# Patient Record
Sex: FEMALE | Race: WHITE | NOT HISPANIC OR LATINO | Employment: OTHER | URBAN - METROPOLITAN AREA
[De-identification: names, ages, dates, MRNs, and addresses within clinical notes are randomized per-mention and may not be internally consistent; named-entity substitution may affect disease eponyms.]

---

## 2017-08-07 ENCOUNTER — RESOLUTE PROFESSIONAL BILLING HOSPITAL PROF FEE (OUTPATIENT)
Dept: HOSPITALIST | Facility: MEDICAL CENTER | Age: 72
End: 2017-08-07
Payer: MEDICARE

## 2017-08-07 ENCOUNTER — APPOINTMENT (OUTPATIENT)
Dept: RADIOLOGY | Facility: MEDICAL CENTER | Age: 72
DRG: 189 | End: 2017-08-07
Attending: INTERNAL MEDICINE
Payer: MEDICARE

## 2017-08-07 ENCOUNTER — HOSPITAL ENCOUNTER (INPATIENT)
Facility: MEDICAL CENTER | Age: 72
LOS: 3 days | DRG: 189 | End: 2017-08-10
Attending: EMERGENCY MEDICINE | Admitting: INTERNAL MEDICINE
Payer: MEDICARE

## 2017-08-07 DIAGNOSIS — J96.20 ACUTE ON CHRONIC RESPIRATORY FAILURE, UNSPECIFIED WHETHER WITH HYPOXIA OR HYPERCAPNIA (HCC): ICD-10-CM

## 2017-08-07 PROBLEM — J96.21 ACUTE ON CHRONIC RESPIRATORY FAILURE WITH HYPOXIA AND HYPERCAPNIA (HCC): Status: ACTIVE | Noted: 2017-08-07

## 2017-08-07 PROBLEM — Z85.118 PERSONAL HISTORY OF LUNG CANCER: Status: ACTIVE | Noted: 2017-08-07

## 2017-08-07 PROBLEM — J96.22 ACUTE ON CHRONIC RESPIRATORY FAILURE WITH HYPOXIA AND HYPERCAPNIA (HCC): Status: ACTIVE | Noted: 2017-08-07

## 2017-08-07 PROBLEM — R79.89 ELEVATED D-DIMER: Status: ACTIVE | Noted: 2017-08-07

## 2017-08-07 PROBLEM — I49.9 ARRHYTHMIA: Status: ACTIVE | Noted: 2017-08-07

## 2017-08-07 PROBLEM — R65.10 SIRS (SYSTEMIC INFLAMMATORY RESPONSE SYNDROME) (HCC): Status: ACTIVE | Noted: 2017-08-07

## 2017-08-07 PROBLEM — R79.89 ELEVATED TROPONIN: Status: ACTIVE | Noted: 2017-08-07

## 2017-08-07 PROBLEM — J44.9 COPD (CHRONIC OBSTRUCTIVE PULMONARY DISEASE) (HCC): Status: ACTIVE | Noted: 2017-08-07

## 2017-08-07 PROBLEM — F03.90 DEMENTIA (HCC): Status: ACTIVE | Noted: 2017-08-07

## 2017-08-07 LAB
ALBUMIN SERPL BCP-MCNC: 3 G/DL (ref 3.2–4.9)
ALBUMIN/GLOB SERPL: 1.1 G/DL
ALP SERPL-CCNC: 58 U/L (ref 30–99)
ALT SERPL-CCNC: 10 U/L (ref 2–50)
ANION GAP SERPL CALC-SCNC: 5 MMOL/L (ref 0–11.9)
ANISOCYTOSIS BLD QL SMEAR: ABNORMAL
AST SERPL-CCNC: 14 U/L (ref 12–45)
BASE EXCESS BLDA CALC-SCNC: 17 MMOL/L (ref -4–3)
BASOPHILS # BLD AUTO: 0 % (ref 0–1.8)
BASOPHILS # BLD: 0 K/UL (ref 0–0.12)
BILIRUB SERPL-MCNC: 0.3 MG/DL (ref 0.1–1.5)
BNP SERPL-MCNC: 78 PG/ML (ref 0–100)
BODY TEMPERATURE: ABNORMAL DEGREES
BUN SERPL-MCNC: 25 MG/DL (ref 8–22)
CALCIUM SERPL-MCNC: 8.6 MG/DL (ref 8.5–10.5)
CHLORIDE SERPL-SCNC: 91 MMOL/L (ref 96–112)
CO2 BLDA-SCNC: 47 MMOL/L (ref 20–33)
CO2 SERPL-SCNC: 39 MMOL/L (ref 20–33)
CREAT SERPL-MCNC: 0.57 MG/DL (ref 0.5–1.4)
EKG IMPRESSION: NORMAL
EOSINOPHIL # BLD AUTO: 0 K/UL (ref 0–0.51)
EOSINOPHIL NFR BLD: 0 % (ref 0–6.9)
ERYTHROCYTE [DISTWIDTH] IN BLOOD BY AUTOMATED COUNT: 52.2 FL (ref 35.9–50)
GFR SERPL CREATININE-BSD FRML MDRD: >60 ML/MIN/1.73 M 2
GLOBULIN SER CALC-MCNC: 2.7 G/DL (ref 1.9–3.5)
GLUCOSE SERPL-MCNC: 117 MG/DL (ref 65–99)
HCO3 BLDA-SCNC: 45 MMOL/L (ref 17–25)
HCT VFR BLD AUTO: 31.5 % (ref 37–47)
HGB BLD-MCNC: 9.4 G/DL (ref 12–16)
LV EJECT FRACT  99904: 55
LV EJECT FRACT MOD 2C 99903: 48.34
LV EJECT FRACT MOD 4C 99902: 63.05
LV EJECT FRACT MOD BP 99901: 59.19
LYMPHOCYTES # BLD AUTO: 0.16 K/UL (ref 1–4.8)
LYMPHOCYTES NFR BLD: 3.5 % (ref 22–41)
MANUAL DIFF BLD: NORMAL
MCH RBC QN AUTO: 26.6 PG (ref 27–33)
MCHC RBC AUTO-ENTMCNC: 29.8 G/DL (ref 33.6–35)
MCV RBC AUTO: 89.2 FL (ref 81.4–97.8)
MICROCYTES BLD QL SMEAR: ABNORMAL
MONOCYTES # BLD AUTO: 0.04 K/UL (ref 0–0.85)
MONOCYTES NFR BLD AUTO: 0.9 % (ref 0–13.4)
MORPHOLOGY BLD-IMP: NORMAL
NEUTROPHILS # BLD AUTO: 4.3 K/UL (ref 2–7.15)
NEUTROPHILS NFR BLD: 95.6 % (ref 44–72)
NRBC # BLD AUTO: 0 K/UL
NRBC BLD AUTO-RTO: 0 /100 WBC
O2/TOTAL GAS SETTING VFR VENT: 40 %
PCO2 BLDA: 75.8 MMHG (ref 26–37)
PCO2 TEMP ADJ BLDA: 73 MMHG (ref 26–37)
PH BLDA: 7.38 [PH] (ref 7.4–7.5)
PH TEMP ADJ BLDA: 7.39 [PH] (ref 7.4–7.5)
PLATELET # BLD AUTO: 119 K/UL (ref 164–446)
PMV BLD AUTO: 10.3 FL (ref 9–12.9)
PO2 BLDA: 62 MMHG (ref 64–87)
PO2 TEMP ADJ BLDA: 58 MMHG (ref 64–87)
POIKILOCYTOSIS BLD QL SMEAR: NORMAL
POTASSIUM SERPL-SCNC: 4.2 MMOL/L (ref 3.6–5.5)
PROCALCITONIN SERPL-MCNC: 1.66 NG/ML
PROT SERPL-MCNC: 5.7 G/DL (ref 6–8.2)
RBC # BLD AUTO: 3.53 M/UL (ref 4.2–5.4)
RBC BLD AUTO: PRESENT
SAO2 % BLDA: 89 % (ref 93–99)
SCHISTOCYTES BLD QL SMEAR: NORMAL
SODIUM SERPL-SCNC: 135 MMOL/L (ref 135–145)
SPECIMEN DRAWN FROM PATIENT: ABNORMAL
SPHEROCYTES BLD QL SMEAR: NORMAL
TROPONIN I SERPL-MCNC: 0.02 NG/ML (ref 0–0.04)
TROPONIN I SERPL-MCNC: 0.03 NG/ML (ref 0–0.04)
TSH SERPL DL<=0.005 MIU/L-ACNC: 1.4 UIU/ML (ref 0.3–3.7)
WBC # BLD AUTO: 4.5 K/UL (ref 4.8–10.8)

## 2017-08-07 PROCEDURE — 85027 COMPLETE CBC AUTOMATED: CPT

## 2017-08-07 PROCEDURE — 84145 PROCALCITONIN (PCT): CPT

## 2017-08-07 PROCEDURE — 93306 TTE W/DOPPLER COMPLETE: CPT | Mod: 26 | Performed by: INTERNAL MEDICINE

## 2017-08-07 PROCEDURE — 94002 VENT MGMT INPAT INIT DAY: CPT

## 2017-08-07 PROCEDURE — 93005 ELECTROCARDIOGRAM TRACING: CPT | Performed by: INTERNAL MEDICINE

## 2017-08-07 PROCEDURE — 700111 HCHG RX REV CODE 636 W/ 250 OVERRIDE (IP): Performed by: INTERNAL MEDICINE

## 2017-08-07 PROCEDURE — 94640 AIRWAY INHALATION TREATMENT: CPT

## 2017-08-07 PROCEDURE — 303105 HCHG CATHETER EXTRA

## 2017-08-07 PROCEDURE — 99291 CRITICAL CARE FIRST HOUR: CPT

## 2017-08-07 PROCEDURE — 80053 COMPREHEN METABOLIC PANEL: CPT

## 2017-08-07 PROCEDURE — 84484 ASSAY OF TROPONIN QUANT: CPT

## 2017-08-07 PROCEDURE — 84443 ASSAY THYROID STIM HORMONE: CPT

## 2017-08-07 PROCEDURE — 770022 HCHG ROOM/CARE - ICU (200)

## 2017-08-07 PROCEDURE — 82803 BLOOD GASES ANY COMBINATION: CPT

## 2017-08-07 PROCEDURE — 700101 HCHG RX REV CODE 250: Performed by: INTERNAL MEDICINE

## 2017-08-07 PROCEDURE — 87040 BLOOD CULTURE FOR BACTERIA: CPT

## 2017-08-07 PROCEDURE — 96365 THER/PROPH/DIAG IV INF INIT: CPT

## 2017-08-07 PROCEDURE — A9270 NON-COVERED ITEM OR SERVICE: HCPCS | Performed by: INTERNAL MEDICINE

## 2017-08-07 PROCEDURE — 96375 TX/PRO/DX INJ NEW DRUG ADDON: CPT

## 2017-08-07 PROCEDURE — 83880 ASSAY OF NATRIURETIC PEPTIDE: CPT

## 2017-08-07 PROCEDURE — 51702 INSERT TEMP BLADDER CATH: CPT

## 2017-08-07 PROCEDURE — 99221 1ST HOSP IP/OBS SF/LOW 40: CPT | Performed by: INTERNAL MEDICINE

## 2017-08-07 PROCEDURE — 36600 WITHDRAWAL OF ARTERIAL BLOOD: CPT

## 2017-08-07 PROCEDURE — 93306 TTE W/DOPPLER COMPLETE: CPT

## 2017-08-07 PROCEDURE — 700105 HCHG RX REV CODE 258: Performed by: INTERNAL MEDICINE

## 2017-08-07 PROCEDURE — 700102 HCHG RX REV CODE 250 W/ 637 OVERRIDE(OP): Performed by: INTERNAL MEDICINE

## 2017-08-07 PROCEDURE — 85007 BL SMEAR W/DIFF WBC COUNT: CPT

## 2017-08-07 PROCEDURE — 71010 DX-CHEST-PORTABLE (1 VIEW): CPT

## 2017-08-07 RX ORDER — LORAZEPAM 2 MG/ML
0.5 INJECTION INTRAMUSCULAR EVERY 6 HOURS PRN
Status: DISCONTINUED | OUTPATIENT
Start: 2017-08-07 | End: 2017-08-10 | Stop reason: HOSPADM

## 2017-08-07 RX ORDER — ONDANSETRON 4 MG/1
4 TABLET, ORALLY DISINTEGRATING ORAL EVERY 4 HOURS PRN
Status: DISCONTINUED | OUTPATIENT
Start: 2017-08-07 | End: 2017-08-10 | Stop reason: HOSPADM

## 2017-08-07 RX ORDER — FUROSEMIDE 20 MG/1
20 TABLET ORAL DAILY
Status: ON HOLD | COMMUNITY
End: 2017-08-10

## 2017-08-07 RX ORDER — METHYLPREDNISOLONE SODIUM SUCCINATE 125 MG/2ML
62.5 INJECTION, POWDER, LYOPHILIZED, FOR SOLUTION INTRAMUSCULAR; INTRAVENOUS EVERY 6 HOURS
Status: DISCONTINUED | OUTPATIENT
Start: 2017-08-07 | End: 2017-08-08

## 2017-08-07 RX ORDER — ONDANSETRON 2 MG/ML
4 INJECTION INTRAMUSCULAR; INTRAVENOUS EVERY 4 HOURS PRN
Status: DISCONTINUED | OUTPATIENT
Start: 2017-08-07 | End: 2017-08-10 | Stop reason: HOSPADM

## 2017-08-07 RX ORDER — METHYLPREDNISOLONE SODIUM SUCCINATE 125 MG/2ML
125 INJECTION, POWDER, LYOPHILIZED, FOR SOLUTION INTRAMUSCULAR; INTRAVENOUS EVERY 6 HOURS
Status: DISCONTINUED | OUTPATIENT
Start: 2017-08-07 | End: 2017-08-07

## 2017-08-07 RX ORDER — BUDESONIDE AND FORMOTEROL FUMARATE DIHYDRATE 160; 4.5 UG/1; UG/1
2 AEROSOL RESPIRATORY (INHALATION)
Status: DISCONTINUED | OUTPATIENT
Start: 2017-08-07 | End: 2017-08-09

## 2017-08-07 RX ORDER — TIOTROPIUM BROMIDE 18 UG/1
1 CAPSULE ORAL; RESPIRATORY (INHALATION)
Status: DISCONTINUED | OUTPATIENT
Start: 2017-08-07 | End: 2017-08-09

## 2017-08-07 RX ORDER — ACETAMINOPHEN 650 MG/1
650 SUPPOSITORY RECTAL EVERY 4 HOURS PRN
Status: DISCONTINUED | OUTPATIENT
Start: 2017-08-07 | End: 2017-08-10 | Stop reason: HOSPADM

## 2017-08-07 RX ORDER — IPRATROPIUM BROMIDE AND ALBUTEROL SULFATE 2.5; .5 MG/3ML; MG/3ML
3 SOLUTION RESPIRATORY (INHALATION)
Status: DISCONTINUED | OUTPATIENT
Start: 2017-08-07 | End: 2017-08-08

## 2017-08-07 RX ORDER — ACETAMINOPHEN 325 MG/1
650 TABLET ORAL EVERY 4 HOURS PRN
Status: DISCONTINUED | OUTPATIENT
Start: 2017-08-07 | End: 2017-08-10 | Stop reason: HOSPADM

## 2017-08-07 RX ORDER — LORAZEPAM 0.5 MG/1
0.5 TABLET ORAL EVERY 6 HOURS PRN
Status: DISCONTINUED | OUTPATIENT
Start: 2017-08-07 | End: 2017-08-10 | Stop reason: HOSPADM

## 2017-08-07 RX ADMIN — AMPICILLIN SODIUM AND SULBACTAM SODIUM 3 G: 2; 1 INJECTION, POWDER, FOR SOLUTION INTRAMUSCULAR; INTRAVENOUS at 14:04

## 2017-08-07 RX ADMIN — BUDESONIDE AND FORMOTEROL FUMARATE DIHYDRATE 2 PUFF: 160; 4.5 AEROSOL RESPIRATORY (INHALATION) at 18:51

## 2017-08-07 RX ADMIN — AMPICILLIN SODIUM AND SULBACTAM SODIUM 3 G: 2; 1 INJECTION, POWDER, FOR SOLUTION INTRAMUSCULAR; INTRAVENOUS at 22:47

## 2017-08-07 RX ADMIN — DOXYCYCLINE 100 MG: 100 INJECTION, POWDER, LYOPHILIZED, FOR SOLUTION INTRAVENOUS at 21:20

## 2017-08-07 RX ADMIN — IPRATROPIUM BROMIDE AND ALBUTEROL SULFATE 3 ML: .5; 3 SOLUTION RESPIRATORY (INHALATION) at 04:29

## 2017-08-07 RX ADMIN — DOXYCYCLINE 100 MG: 100 INJECTION, POWDER, LYOPHILIZED, FOR SOLUTION INTRAVENOUS at 03:04

## 2017-08-07 RX ADMIN — CEFTRIAXONE 2 G: 2 INJECTION, POWDER, FOR SOLUTION INTRAMUSCULAR; INTRAVENOUS at 08:54

## 2017-08-07 RX ADMIN — METHYLPREDNISOLONE SODIUM SUCCINATE 62.5 MG: 125 INJECTION, POWDER, FOR SOLUTION INTRAMUSCULAR; INTRAVENOUS at 06:32

## 2017-08-07 RX ADMIN — TIOTROPIUM BROMIDE 1 CAPSULE: 18 CAPSULE ORAL; RESPIRATORY (INHALATION) at 18:51

## 2017-08-07 RX ADMIN — IPRATROPIUM BROMIDE AND ALBUTEROL SULFATE 3 ML: .5; 3 SOLUTION RESPIRATORY (INHALATION) at 08:44

## 2017-08-07 RX ADMIN — ENOXAPARIN SODIUM 40 MG: 100 INJECTION SUBCUTANEOUS at 08:54

## 2017-08-07 RX ADMIN — IPRATROPIUM BROMIDE AND ALBUTEROL SULFATE 3 ML: .5; 3 SOLUTION RESPIRATORY (INHALATION) at 18:37

## 2017-08-07 RX ADMIN — METHYLPREDNISOLONE SODIUM SUCCINATE 125 MG: 125 INJECTION, POWDER, FOR SOLUTION INTRAMUSCULAR; INTRAVENOUS at 02:39

## 2017-08-07 RX ADMIN — IPRATROPIUM BROMIDE AND ALBUTEROL SULFATE 3 ML: .5; 3 SOLUTION RESPIRATORY (INHALATION) at 14:10

## 2017-08-07 RX ADMIN — IPRATROPIUM BROMIDE AND ALBUTEROL SULFATE 3 ML: .5; 3 SOLUTION RESPIRATORY (INHALATION) at 22:02

## 2017-08-07 RX ADMIN — DOXYCYCLINE 100 MG: 100 INJECTION, POWDER, LYOPHILIZED, FOR SOLUTION INTRAVENOUS at 08:54

## 2017-08-07 RX ADMIN — METHYLPREDNISOLONE SODIUM SUCCINATE 62.5 MG: 125 INJECTION, POWDER, FOR SOLUTION INTRAMUSCULAR; INTRAVENOUS at 11:24

## 2017-08-07 RX ADMIN — METHYLPREDNISOLONE SODIUM SUCCINATE 62.5 MG: 125 INJECTION, POWDER, FOR SOLUTION INTRAMUSCULAR; INTRAVENOUS at 17:15

## 2017-08-07 RX ADMIN — AMPICILLIN SODIUM AND SULBACTAM SODIUM 3 G: 2; 1 INJECTION, POWDER, FOR SOLUTION INTRAMUSCULAR; INTRAVENOUS at 17:16

## 2017-08-07 ASSESSMENT — PAIN SCALES - GENERAL
PAINLEVEL_OUTOF10: 0

## 2017-08-07 ASSESSMENT — LIFESTYLE VARIABLES
EVER_SMOKED: YES
DO YOU DRINK ALCOHOL: NO

## 2017-08-07 ASSESSMENT — PULMONARY FUNCTION TESTS: EPAP_CMH2O: 8

## 2017-08-07 NOTE — IP AVS SNAPSHOT
" <p align=\"LEFT\"><IMG SRC=\"//[email or web address]$/[email or web address]\" alt=\"Image\" WIDTH=\"50%\" HEIGHT=\"200\" BORDER=\"\"></p>                   Name:Lizette Salgado  Medical Record Number:[id number]  CSN: [id number]    YOB: 1945   Age: 71 y.o.  Sex: female  HT:1.702 m (5' 7.01\") WT: 58.2 kg (128 lb 4.9 oz)          Admit Date: 8/7/2017     Discharge Date:   Today's Date: 8/10/2017  Attending Doctor:  Marnie Hicks M.D.                  Allergies:  Pollen extract          Follow-up Information     1. Follow up with [facility] ([facility]) .    Specialty:  Home Health Services    Contact information    [address]  [place] [address]  [phone]         Medication List      Take these Medications        Instructions    acetaZOLAMIDE 250 MG Tabs   Commonly known as:  DIAMOX    Take 1 Tab by mouth 2 Times a Day.   Dose:  250 mg       amoxicillin-clavulanate 875-125 MG Tabs   Commonly known as:  AUGMENTIN    Take 1 Tab by mouth 2 times a day for 6 days.   Dose:  1 Tab       BENICAR 40 MG Tabs   Generic drug:  olmesartan    Take 40 mg by mouth every day.   Dose:  40 mg       budesonide-formoterol 160-4.5 MCG/ACT Aero   Commonly known as:  SYMBICORT    Inhale 2 Puffs by mouth 2 Times a Day.   Dose:  2 Puff       doxycycline monohydrate 100 MG tablet   Commonly known as:  ADOXA    Take 1 Tab by mouth every 12 hours for 5 days.   Dose:  100 mg       ipratropium-albuterol 0.5-2.5 (3) MG/3ML nebulizer solution   Commonly known as:  DUONEB    3 mL by Nebulization route every 6 hours as needed for Shortness of Breath.   Dose:  3 mL       metoprolol 200 MG XL tablet   Commonly known as:  TOPROL-XL    Take 200 mg by mouth every day.   Dose:  200 mg       potassium chloride SA 20 MEQ Tbcr   Commonly known as:  Kdur    Take 1 Tab by mouth every day.   Dose:  20 mEq       predniSONE 20 MG Tabs   Start taking on:  8/11/2017   Commonly known as:  DELTASONE    Take 2 tablets po daily for 4 days.       terazosin 5 " MG Caps   Commonly known as:  HYTRIN    Take 5 mg by mouth 2 times a day.   Dose:  5 mg       tiotropium 18 MCG Caps   Commonly known as:  SPIRIVA    Inhale 1 Cap by mouth every day.   Dose:  18 mcg

## 2017-08-07 NOTE — IP AVS SNAPSHOT
8/10/2017    Lizette Salgado  [address]  [place] [address]    Dear Lizette:    [facility] wants to ensure your discharge home is safe and you or your loved ones have had all of your questions answered regarding your care after you leave the hospital.    Below is a list of resources and contact information should you have any questions regarding your hospital stay, follow-up instructions, or active medical symptoms.    Questions or Concerns Regarding… Contact   Medical Questions Related to Your Discharge  (7 days a week, 8am-5pm) Contact a Nurse Care Coordinator   [phone]   Medical Questions Not Related to Your Discharge  (24 hours a day / 7 days a week)  Contact the Nurse Health Line   [phone]    Medications or Discharge Instructions Refer to your discharge packet   or contact your [facility] Primary Care Provider   [phone]   Follow-up Appointment(s) Schedule your appointment via [employer]   or contact Scheduling [phone]   Billing Review your statement via [employer]ici  or contact Billing [phone]   Medical Records Review your records via [employer]   or contact Medical Records [phone]     You may receive a telephone call within two days of discharge. This call is to make certain you understand your discharge instructions and have the opportunity to have any questions answered. You can also easily access your medical information, test results and upcoming appointments via the [employer] free online health management tool. You can learn more and sign up at [email or web address]. For assistance setting up your [employer] account, please call [phone].    Once again, we want to ensure your discharge home is safe and that you have a clear understanding of any next steps in your care. If you have any questions or concerns, please do not hesitate to contact us, we are here for you. Thank you for choosing [facility] for your healthcare needs.    Sincerely,    Your [facility] Healthcare Team

## 2017-08-07 NOTE — IP AVS SNAPSHOT
" Home Care Instructions                                                                                                                  Name:Lizette Salgado  Medical Record Number:[id number]  CSN: [id number]    YOB: 1945   Age: 71 y.o.  Sex: female  HT:1.702 m (5' 7.01\") WT: 58.2 kg (128 lb 4.9 oz)          Admit Date: 8/7/2017     Discharge Date:   Today's Date: 8/10/2017  Attending Doctor:  Marnie Hicks M.D.                  Allergies:  Pollen extract            Discharge Instructions       Discharge Instructions    Discharged to home by car with relative. Discharged via wheelchair, hospital escort: Yes.  Special equipment needed: Not Applicable    Be sure to schedule a follow-up appointment with your primary care doctor or any specialists as instructed.     Discharge Plan:   Diet Plan: Discussed  Activity Level: Discussed  Confirmed Follow up Appointment: Patient to Call and Schedule Appointment  Confirmed Symptoms Management: Discussed  Medication Reconciliation Updated: Yes  Influenza Vaccine Indication: Not indicated: Previously immunized this influenza season and > 8 years of age    I understand that a diet low in cholesterol, fat, and sodium is recommended for good health. Unless I have been given specific instructions below for another diet, I accept this instruction as my diet prescription.   Other diet: Regular as tolerated.     Special Instructions: None    · Is patient discharged on Warfarin / Coumadin?   No     · Is patient Post Blood Transfusion?  No    Depression / Suicide Risk    As you are discharged from this [facility] facility, it is important to learn how to keep safe from harming yourself.    Recognize the warning signs:  · Abrupt changes in personality, positive or negative- including increase in energy   · Giving away possessions  · Change in eating patterns- significant weight changes-  positive or negative  · Change in sleeping patterns- unable to sleep or sleeping all the " time   · Unwillingness or inability to communicate  · Depression  · Unusual sadness, discouragement and loneliness  · Talk of wanting to die  · Neglect of personal appearance   · Rebelliousness- reckless behavior  · Withdrawal from people/activities they love  · Confusion- inability to concentrate     If you or a loved one observes any of these behaviors or has concerns about self-harm, here's what you can do:  · Talk about it- your feelings and reasons for harming yourself  · Remove any means that you might use to hurt yourself (examples: pills, rope, extension cords, firearm)  · Get professional help from the community (Mental Health, Substance Abuse, psychological counseling)  · Do not be alone:Call your Safe Contact- someone whom you trust who will be there for you.  · Call your local CRISIS HOTLINE 398-9454 or 115-741-8566  · Call your local Children's Mobile Crisis Response Team [place] (539) 345-9455 or www.[employer]  · Call the toll free National Suicide Prevention Hotlines   · National Suicide Prevention Lifeline 020-722-ZMKU (1702)  · [place] Hope Line Network 800-SUICIDE (958-3986)        Follow-up Information     1. Follow up with [facility] ([facility]) .    Specialty:  Home Health Services    Contact information    [address]  [facility] [address]  [phone]         Discharge Medication Instructions:    Below are the medications your physician expects you to take upon discharge:    Review all your home medications and newly ordered medications with your doctor and/or pharmacist. Follow medication instructions as directed by your doctor and/or pharmacist.    Please keep your medication list with you and share with your physician.               Medication List      START taking these medications        Instructions    Morning Afternoon Evening Bedtime    acetaZOLAMIDE 250 MG Tabs   Last time this was given:  250 mg on 8/10/2017  5:59 AM   Commonly known as:   DIAMOX        Take 1 Tab by mouth 2 Times a Day.   Dose:  250 mg                        amoxicillin-clavulanate 875-125 MG Tabs   Commonly known as:  AUGMENTIN        Take 1 Tab by mouth 2 times a day for 6 days.   Dose:  1 Tab                        budesonide-formoterol 160-4.5 MCG/ACT Aero   Last time this was given:  2 Puffs on 8/10/2017  8:50 AM   Commonly known as:  SYMBICORT        Inhale 2 Puffs by mouth 2 Times a Day.   Dose:  2 Puff                        doxycycline monohydrate 100 MG tablet   Last time this was given:  100 mg on 8/10/2017  8:45 AM   Commonly known as:  ADOXA        Take 1 Tab by mouth every 12 hours for 5 days.   Dose:  100 mg                        ipratropium-albuterol 0.5-2.5 (3) MG/3ML nebulizer solution   Last time this was given:  3 mL on 8/9/2017  7:01 AM   Commonly known as:  DUONEB        3 mL by Nebulization route every 6 hours as needed for Shortness of Breath.   Dose:  3 mL                        potassium chloride SA 20 MEQ Tbcr   Last time this was given:  20 mEq on 8/10/2017  8:45 AM   Commonly known as:  Kdur        Take 1 Tab by mouth every day.   Dose:  20 mEq                        predniSONE 20 MG Tabs   Start taking on:  8/11/2017   Last time this was given:  60 mg on 8/10/2017  8:47 AM   Commonly known as:  DELTASONE        Take 2 tablets po daily for 4 days.                        tiotropium 18 MCG Caps   Last time this was given:  1 Cap on 8/10/2017  8:50 AM   Commonly known as:  SPIRIVA        Inhale 1 Cap by mouth every day.   Dose:  18 mcg                          CONTINUE taking these medications        Instructions    Morning Afternoon Evening Bedtime    BENICAR 40 MG Tabs   Last time this was given:  40 mg on 8/10/2017  8:48 AM   Generic drug:  olmesartan        Take 40 mg by mouth every day.   Dose:  40 mg                        metoprolol 200 MG XL tablet   Last time this was given:  200 mg on 8/10/2017  8:46 AM   Commonly known as:  TOPROL-XL        Take  200 mg by mouth every day.   Dose:  200 mg                        terazosin 5 MG Caps   Last time this was given:  5 mg on 8/10/2017 10:49 AM   Commonly known as:  HYTRIN        Take 5 mg by mouth 2 times a day.   Dose:  5 mg                          STOP taking these medications     furosemide 20 MG Tabs   Commonly known as:  LASIX               hydrochlorothiazide 12.5 MG tablet   Commonly known as:  HYDRODIURIL               HYDROCHLOROTHIAZIDE PO                    Where to Get Your Medications      These medications were sent to [facility] - [place], [place] - [address] [address] [facility] [address]     Phone:  [phone]    - acetaZOLAMIDE 250 MG Tabs  - amoxicillin-clavulanate 875-125 MG Tabs  - budesonide-formoterol 160-4.5 MCG/ACT Aero  - doxycycline monohydrate 100 MG tablet  - ipratropium-albuterol 0.5-2.5 (3) MG/3ML nebulizer solution  - potassium chloride SA 20 MEQ Tbcr  - predniSONE 20 MG Tabs  - tiotropium 18 MCG Caps            Orders for after discharge     REFERRAL TO HOME HEALTH    Complete by:  As directed    Home health will create and establish a plan of care             Instructions           Diet / Nutrition:    Follow any diet instructions given to you by your doctor or the dietician, including how much salt (sodium) you are allowed each day.    If you are overweight, talk to your doctor about a weight reduction plan.    Activity:    Remain physically active following your doctor's instructions about exercise and activity.    Rest often.     Any time you become even a little tired or short of breath, SIT DOWN and rest.    Worsening Symptoms:    Report any of the following signs and symptoms to the doctor's office immediately:    *Pain of jaw, arm, or neck  *Chest pain not relieved by medication                               *Dizziness or loss of consciousness  *Difficulty breathing even when at rest   *More tired than usual                                        *Bleeding drainage or swelling of surgical site  *Swelling of feet, ankles, legs or stomach                 *Fever (>100ºF)  *Pink or blood tinged sputum  *Weight gain (3lbs/day or 5lbs /week)           *Shock from internal defibrillator (if applicable)  *Palpitations or irregular heartbeats                *Cool and/or numb extremities    Stroke Awareness    Common Risk Factors for Stroke include:    Age  Atrial Fibrillation  Carotid Artery Stenosis  Diabetes Mellitus  Excessive alcohol consumption  High blood pressure  Overweight   Physical inactivity  Smoking    Warning signs and symptoms of a stroke include:    *Sudden numbness or weakness of the face, arm or leg (especially on one side of the body).  *Sudden confusion, trouble speaking or understanding.  *Sudden trouble seeing in one or both eyes.  *Sudden trouble walking, dizziness, loss of balance or coordination.Sudden severe headache with no known cause.    It is very important to get treatment quickly when a stroke occurs. If you experience any of the above warning signs, call 911 immediately.                   Disclaimer         Quit Smoking / Tobacco Use:    I understand the use of any tobacco products increases my chance of suffering from future heart disease or stroke and could cause other illnesses which may shorten my life. Quitting the use of tobacco products is the single most important thing I can do to improve my health. For further information on smoking / tobacco cessation call a Toll Free Quit Line at 1-791.243.4079 (*National Cancer [place]) or 1-502.449.4700 (American Lung Association) or you can access the web based program at www.lungusa.org.    [place] Tobacco Users Help Line:  (927) 322-2725       Toll Free: 1-286.341.8175  Quit Tobacco Program [facility] (675)160-3774    Crisis Hotline:    [place] Crisis Hotline:  7-917-FXNIFBO or 1-251.908.7358    [place] Crisis Hotline:    1-757.538.7047 or  555.475.3160    Discharge Survey:   Thank you for choosing [facility]. We hope we did everything we could to make your hospital stay a pleasant one. You may be receiving a phone survey and we would appreciate your time and participation in answering the questions. Your input is very valuable to us in our efforts to improve our service to our patients and their families.        My signature on this form indicates that:    1. I have reviewed and understand the above information.  2. My questions regarding this information have been answered to my satisfaction.  3. I have formulated a plan with my discharge nurse to obtain my prescribed medications for home.                  Disclaimer         __________________________________                     __________       ________                       Patient Signature                                                 Date                    Time

## 2017-08-07 NOTE — H&P
CHIEF COMPLAINT: shortness of breath    HPI: This is a 71-year-old female with past medical history of COPD dementia who presented to [facility] complaining of shortness of breath worsening over the past 2 days associated with productive cough she denied chest pain dizziness she recently had her Lasix dose decreased by her outpatient physician and seem to be getting worse since that time patient has a history of smoking but apparently quit 6 months ago she is chronically on oxygen at 2-4 L at home. Despite initial improvement after nebulizer treatment her respiratory status deteriorated given her borderline troponin was felt she required heil higher level of care and she was transferred here. Emergency room physician discussed the patient's code status with the patient's spouse and he wished for her to be a full code.    PMH: COPD on home oxygen, dementia. Lung cancer treated w/ [facility].  No prior Dx CHF, No h/o MI    MEDS: Lasix 20 mg, Hytrin 5 mg, Toprol 50 mg, albuterol nebulizer, Advair Diskus 250/50, Spiriva    PSH: Open lung biopsy, Hyst, Open Choley    FMH: Per our records- hypertension and lung disease    SOCIAL HISTORY: , doesn't drink, quit smoking 6 months ago, 6 children- only 1 daughter lives near her, the rest out of state    CODE STATUS: Per ERP discussion with spouse: full code    ROS: Patient has dementia and is sedated on BiPAP- unable to obtain    EXAM:     VITALS:  At [facility] d/c: Blood pressure 101/61 pulse 102 respirations 14 temperature 98.9 saturating 97.2% on BiPAP    Temperature here 36.8 pulse 109 respirations 22 blood pressure 122/67 saturating 98% on 15 L with BiPAP    GENERAL: Pale elderly  female heavily sedated on BiPAP she looks older than chronological age    HEENT: Pupils 1 mm and reactive, head normocephalic atraumatic. Unable to visualize oral cavity due to BiPAP mask    NECK: Supple I cannot appreciate a JVD -she has a lot of wrinkles    LUNGS:  Despite BiPAP they're markedly diminished there are no appreciable wheezes or rales    HEART: Tachycardia regular    ABDOMEN: Somewhat protuberant but not overtly distended she does grimace when I palpate her abdomen but there is no rebound guarding or appreciable mass    EXTREMITIES: She has 2-3+ edema from mid shin down bilaterally is 3+ over her feet.    NEURO: Sedated but seems to move all extremities when I palpate her belly    PSYCH: Unable to discern due to sedation    LABS/ RAD:  From [facility]: ABG: pH 7.31,pCO2 82.4, pO2 59.6 4L  WBC 5.1, hemoglobin 10.3 hematocrit 32.3 platelet count 118  67% polys.  Sodium 135 potassium 4.2 chloride 92 bicarb 44 glucose 119 BUN 28 creatinine 0.82 albumin 2.7 calcium 9. D-dimer 1.10 troponin 0.08.   No chest x-ray images were sent radiologic report describes chronic changes of the lungs with vascular congestion and pleural effusions cannot be ruled out congestive heart failure versus pulmonary fibrosis.  EKG the computer reads it as a flutter with PVCs her rate is 136- I reviewed the tracing- I don't think this is flutter think this is sinus tachycardia I don't see any evidence of ischemia    A/P:  1. Acute on chronic respiratory failure with hypercapnia and hypoxemia. Chest x-ray interpretation that accompanies the patient gives a broad differential we will repeat her imaging. If there is insufficient findings on the chest x-ray to explain her hypoxemia consider CT of the chest as she does have a positive d-dimer. We'll also get a BNP to evaluate for possibility of pulmonary edema echocardiogram has also been ordered. Given that I cannot review images pneumonia is certainly in the differential so we will order blood cultures and start her on empiric therapy. Due to her BiPAP she will require ICU admission and pulmonary is aware of the consultation. IV steroids and RT protocol ordered.    2. Elevated troponin this is only mildly elevated we'll obtain follow-up studies,  echocardiogram. This is not high enough to be diagnostic of NSTEMI. EKG shows no obvious ischemia    3. Arrhythmia- there were sinus pauses as well [facility]    4. Query history of congestive heart failure given her home medications- family denies prior diagnosis -echo has been ordered-    I reviewed her CXR here-  there are stippled markings in her upper lobes this is not convincing for CHF. Will not order lasix at this time    5. Elevated d-dimer, CXR does not fully account for hypoxemia- CTA ordered    6. History of dementia per family never diagnosed but some days she does not recognize her spouse.    Patient is critically ill with hypoxic respiratory failure likely secondary to exacerbation of COPD -

## 2017-08-07 NOTE — CARE PLAN
Problem: Safety  Goal: Will remain free from injury  Safety and fall precautions in place, bed in lowest position, trend socks on patient.  Educated patient on call light system.  Personal items and call light within reach, bed alarm on.      Problem: Knowledge Deficit  Goal: Knowledge of disease process/condition, treatment plan, diagnostic tests, and medications will improve  Bedside report received.  POC discussed with patient, , daughter and son-in-law, addressed their questions and concerns to the best of my ability.

## 2017-08-07 NOTE — RESPIRATORY CARE
Bipap/Cpap mask placed.  Can the patient demonstrate removal of mask? NO  If no, please notify physician.    Physician aware. BiPAP not to be worn per Jensen

## 2017-08-07 NOTE — CONSULTS
Critical Care/Pulmonary Consultation    Date of service: 8/7/2017    Consulting Physician:  Angel Luis Wisdom MD    History of Present Illness: I was called to the emergency department to evaluate this patient was placed on BiPAP for respiratory failure. She is a 71-year-old female with a history of fairly advanced dementia, severe COPD with chronic hypercapnia, chronic hypoxia on supplemental oxygen, prior lung cancer treated with CyberKnife therapy. Her  tells me she has been in the hospital nearly monthly for the past several months. She was recently admitted for COPD exacerbation treated with BiPAP for several days at [facility]. Today she was brought to [facility] complaining of increasing dyspnea and breathing problems over the past 2-3 days. She did have an increasing productive cough. She initially improved with nebulized bronchodilators however then declined and was transferred to [facility]. In the ED she was in significant respiratory distress with very little air in tree. She was placed on AVAPS/BiPAP. She did receive some Ativan in transport and was tolerating the BiPAP well. She was initially full CODE STATUS. However had a long discussion with the patient's  as well as her daughter at bedside. He tells me he wouldn't want any treatments that would cause her to suffer. He does want to continue with BiPAP trial but would not want intubation or chest compressions. I therefore changed her CODE STATUS to DO NOT RESUSCITATE.    No current facility-administered medications on file prior to encounter.     Current Outpatient Prescriptions on File Prior to Encounter   Medication Sig Dispense Refill   • metoprolol (TOPROL-XL) 200 MG XL tablet Take 200 mg by mouth every day.     • terazosin (HYTRIN) 5 MG Cap Take 5 mg by mouth 2 times a day.     • olmesartan (BENICAR) 40 MG Tab Take 40 mg by mouth every day.     • hydrochlorothiazide (HYDRODIURIL) 12.5 MG tablet  "Take 12.5 mg by mouth 3 times a day.     • HYDROCHLOROTHIAZIDE PO Take  by mouth. 12 mg two tablets once a day         Social History   Substance Use Topics   • Smoking status: Never Smoker    • Smokeless tobacco: Never Used   • Alcohol Use: No        Past Medical History   Diagnosis Date   • Lung cancer (CMS-[facility])    • COPD (chronic obstructive pulmonary disease) (CMS-[facility])    • [place] measles        Past Surgical History   Procedure Laterality Date   • Lung biopsy open         Allergies: Eggs and Pollen extract    Family History   Problem Relation Age of Onset   • Hypertension Mother    • Lung Disease Father        Filed Vitals:    08/07/17 0112 08/07/17 0114 08/07/17 0212 08/07/17 0230   Height: 1.702 m (5' 7.01\")      Weight: 68.04 kg (150 lb)      Weight % change since last entry.: 0 %      BP:  122/67     Pulse:  109 89 92   BMI (Calculated): 23.49      Resp:  22 25 33   Temp:  36.8 °C (98.3 °F)      08/07/17 0245 08/07/17 0300 08/07/17 0418   Height:      Weight:      Weight % change since last entry.:      BP:      Pulse: 92 89 95   BMI (Calculated):      Resp: 23 21 20   Temp:          Physical Examination  General: Sedated but arouses, on BiPAP  Neuro/Psych: Somnolent and not following commands consistently, withdraws all extremities  HEENT: PERRL, mucous membranes mildly dry, trachea midline, no crepitus  CVS: Regular rate and rhythm  Respiratory: Very diminished air entry throughout, moderate expiratory wheeze bilaterally  Abdomen/: Soft, nontender  Extremities: 2+ pitting edema lower extremities, capillary refill intact  Skin: Cool, dry, no rash    Recent Labs      08/07/17 0114   WBC  4.5*   NEUTSPOLYS  95.60*   LYMPHOCYTES  3.50*   MONOCYTES  0.90   EOSINOPHILS  0.00   BASOPHILS  0.00   ASTSGOT  14   ALTSGPT  10   ALKPHOSPHAT  58   TBILIRUBIN  0.3     Recent Labs      08/07/17 0114   SODIUM  135   POTASSIUM  4.2   CHLORIDE  91*   CO2  39*   BUN  25*   CREATININE  0.57   CALCIUM  8.6     Recent " Labs      08/07/17   0114   ALTSGPT  10   ASTSGOT  14   ALKPHOSPHAT  58   TBILIRUBIN  0.3   GLUCOSE  117*           Invalid input(s): [id number]  DX-CHEST-PORTABLE (1 VIEW)    (Results Pending)   ECHOCARDIOGRAM COMP W/O CONT    (Results Pending)   CT-CTA CHEST PULMONARY ARTERY W/ RECONS    (Results Pending)       Assessment and Plan:   Acute on chronic hypoxemic and hypercapnic respiratory failure   - Arterial blood gas on BiPAP shows significant chronic hypercapnia.   - She has very diminished air entry throughout but is tolerating BiPAP at this time   - CODE STATUS is DO NOT RESUSCITATE  Severe COPD with acute exacerbation   - I will continue with nebulized bronchodilators and respiratory therapy protocols   - She was given 125 mg of Solu-Medrol in the ED. He'll continue this but decrease the dose to 62.5 every 6   - She has been empirically started on antibiotics with ceftriaxone and doxycycline   - I have ordered a pro-calcitonin level  History of congestive heart failure?   - She does have lower extremity edema which I suspect is more related to cor pulmonale    - no overt pulmonary edema on chest imaging   - We'll maintain euvolemic    - Echocardiogram has been ordered  Advanced dementia  History of lung cancer treated with CyberKnife therapy, details unclear  Prophylaxis   Monitor/correct lytes  Family has endorsed a DO NOT RESUSCITATE status  Patient is critically ill with high risk for further critical organ system deterioration. She'll be monitored very closely in intensive care unit.     The patient remains critically ill.  Critical care time = 60 minutes in directly providing and coordinating critical care and extensive data review.  No time overlap and excludes procedures.

## 2017-08-07 NOTE — PROGRESS NOTES
Pulmonary Critical Care Progress Note      Date of service:  2017      Interval Events:  24 hour interval history reviewed  Reason for visit:  Respiratory failure, AECOPD       - dementia   - BiPAP   - DNR/DNI   - ST   - CXR with mild edema and LLL SSA    PFSH:  No change.    Respiratory:     Pulse Oximetry: 92 %  CXR personally reviewed  CXR with mild edema and LLLSSA  AVAPS  No wheezing  Improved air movement  Few coarse crackles bilaterally    HemoDynamics:  Pulse: 99, Heart Rate (Monitored): 96  Blood Pressure : 122/67 mmHg, NIBP: 113/64 mmHg     SR     Recent Labs      17   TROPONINI  0.03       Neuro:  More alert this afternoon.    Fluids:  Intake/Output       17 - 17 0659 (Not Admitted) 17 - 17 0659 (Not Admitted) 17 - 17 0659      [id number] 7043-2005 Total 8297-2795 7006-6325 Total 7956-2106 9440-5015 Total       Intake    Total Intake -- -- -- -- -- -- -- -- --       Output    Stool  --  -- --  --  -- --  --  -- --    Number of Times Stooled -- -- -- -- 0 x 0 x -- -- --    Total Output -- -- -- -- -- -- -- -- --       Net I/O     -- -- -- -- -- -- -- -- --        Weight: 62.6 kg (138 lb 0.1 oz)  Recent Labs      17   SODIUM  135   POTASSIUM  4.2   CHLORIDE  91*   CO2  39*   BUN  25*   CREATININE  0.57   CALCIUM  8.6       GI/Nutrition:  Abd soft ND/NT    Liver Function  Recent Labs      17   ALTSGPT  10   ASTSGOT  14   ALKPHOSPHAT  58   TBILIRUBIN  0.3   GLUCOSE  117*       Heme:  Recent Labs      17   RBC  3.53*   HEMOGLOBIN  9.4*   HEMATOCRIT  31.5*   PLATELETCT  119*       Infectious Disease:  Temp  Av.8 °C (98.3 °F)  Min: 36.8 °C (98.3 °F)  Max: 36.8 °C (98.3 °F)  Monitored Temp  Av.3 °C (97.3 °F)  Min: 36.2 °C (97.2 °F)  Max: 36.3 °C (97.3 °F)    Recent Labs      17   0114   WBC  4.5*   NEUTSPOLYS  95.60*   LYMPHOCYTES  3.50*   MONOCYTES  0.90   EOSINOPHILS  0.00   BASOPHILS  0.00    ASTSGOT  14   ALTSGPT  10   ALKPHOSPHAT  58   TBILIRUBIN  0.3     Current Facility-Administered Medications   Medication Dose Frequency Provider Last Rate Last Dose   • Respiratory Care per Protocol   Continuous RT Kaley Travis M.D.       • enoxaparin (LOVENOX) inj 40 mg  40 mg DAILY Kaley Travis M.D.       • acetaminophen (TYLENOL) tablet 650 mg  650 mg Q4HRS PRN Kaley Travis M.D.        Or   • acetaminophen (TYLENOL) suppository 650 mg  650 mg Q4HRS PRN Kaley Travis M.D.       • doxycycline (VIBRAMYCIN) 100 mg in  mL IVPB  100 mg Q12HRS Kaley Travis M.D.   Stopped at 08/07/17 0404   • cefTRIAXone (ROCEPHIN) 2 g in  mL IVPB  2 g Q24HRS Kaley Travis M.D.       • ondansetron (ZOFRAN) syringe/vial injection 4 mg  4 mg Q4HRS PRN Kaley Travis M.D.       • ondansetron (ZOFRAN ODT) dispertab 4 mg  4 mg Q4HRS PRN Kaley Travis M.D.       • lorazepam (ATIVAN) tablet 0.5 mg  0.5 mg Q6HRS PRN Kaley Travis M.D.        Or   • lorazepam (ATIVAN) injection 0.5 mg  0.5 mg Q6HRS PRN Kaley Travis M.D.       • methylPREDNISolone sod succ (SOLU-MEDROL) 125 MG injection 62.5 mg  62.5 mg Q6HRS Dago Mancia M.D.       • ipratropium-albuterol (DUONEB) nebulizer solution 3 mL  3 mL Q4HRS (RT) Dago Mancia M.D.   3 mL at 08/07/17 0429     Last reviewed on 10/13/2016  2:04 PM by Radha Sims M.D.    Quality  Measures:  Labs reviewed, Medications reviewed and Radiology images reviewed                        Assessment and Plan:    Acute on chronic hypercarbic and hypoxemic respiratory failure   - unable to remove mask by herself, therefore will stop AVAPS/BiPAP   - cont oxygen  AECOPD - cont IV Solumedrol   - cont BDs  Acute tracheobronchitis   - change Rocephin to Unasyn   - cont doxycycline   - complete 8 days of therapy  Query CHF   - check echo  Dementia  Hx of lung CA - treated with CyberKnife  DNR/DNI    Keep in ICU.  Unstable  pulmonary status.  High risk for deterioration and worsening vital organ dysfunction.    Critical Care Time:  75 minutes in addition to Dr. Mancia earlier today  [id number]  No time overlap  Time excludes procedures  Discussed with RN, RT, Team

## 2017-08-07 NOTE — PROGRESS NOTES
Report received from STACIE Bradford. Pt was picked up by ACLS RN and CCT. Pt arrived to [facility] at 0325 on bipap with assistance from RT. Pt transferred to bed. Monitor placed. Assessment performed. Pt now more alert, but still too weak to remove Bipap mask on her own. Paged Dr. Mancia to assess pt and speak to family about code status. Safety and fall precautions in place. Bed in lowest position. Trend socks on patient.  Bed alarm on.

## 2017-08-07 NOTE — IP AVS SNAPSHOT
Nyxoah Access Code: [email or web address]  Expires: 9/9/2017 12:37 PM    Your email address is not on file at [facility].  Email Addresses are required for you to sign up for Nyxoah, please contact [phone] to verify your personal information and to provide your email address prior to attempting to register for Nyxoah.    Lizette Salgado  [address] [place] [address]    Nyxoah  A secure, online tool to manage your health information     [facility]’s Nyxoah® is a secure, online tool that connects you to your personalized health information from the privacy of your home -- day or night - making it very easy for you to manage your healthcare. Once the activation process is completed, you can even access your medical information using the Nyxoah galina, which is available for free in the Apple Galina store or Google Play store.     To learn more about Nyxoah, visit [email or web address]    There are two levels of access available (as shown below):   My Chart Features  [facility] Primary Care Doctor [facility]  Specialists [facility]  Urgent  Care Non-[facility] Primary Care Doctor   Email your healthcare team securely and privately 24/7 X X X    Manage appointments: schedule your next appointment; view details of past/upcoming appointments X      Request prescription refills. X      View recent personal medical records, including lab and immunizations X X X X   View health record, including health history, allergies, medications X X X X   Read reports about your outpatient visits, procedures, consult and ER notes X X X X   See your discharge summary, which is a recap of your hospital and/or ER visit that includes your diagnosis, lab results, and care plan X X  X     How to register for Taggablet:  Once your e-mail address has been verified, follow the following steps to sign up for Taggablet.     1. Go to  [email or web address]  2. Click on the Sign Up Now box, which takes you to the New Member Sign Up page. You will  need to provide the following information:  a. Enter your TabSys Access Code exactly as it appears at the top of this page. (You will not need to use this code after you’ve completed the sign-up process. If you do not sign up before the expiration date, you must request a new code.)   b. Enter your date of birth.   c. Enter your home email address.   d. Click Submit, and follow the next screen’s instructions.  3. Create a [employer]t ID. This will be your TabSys login ID and cannot be changed, so think of one that is secure and easy to remember.  4. Create a TabSys password. You can change your password at any time.  5. Enter your Password Reset Question and Answer. This can be used at a later time if you forget your password.   6. Enter your e-mail address. This allows you to receive e-mail notifications when new information is available in TabSys.  7. Click Sign Up. You can now view your health information.    For assistance activating your TabSys account, call [phone]

## 2017-08-07 NOTE — ED PROVIDER NOTES
ED Provider Note    CHIEF COMPLAINT  Chief Complaint   Patient presents with   • Respiratory Distress       HPI  Lizette Salgado is a 71 y.o. female who presents as a transfer from the hospital at [facility] where she presented with acute exacerbation of COPD and CHF short of breath. Apparently she got worse 2 days ago. She is oxygen dependent at home but has dementia apparently tonight someone felt that she probably should receive comfort care but her family wished more aggressive measures undertaken and she was transferred here for higher level of care. She is on BiPAP and tolerating it fairly well although she's had to receive a lot of sedation I can't obtain any further history at this time. Patient is sedated does not follow commands    REVIEW OF SYSTEMS  See HPI for further details. Cannot be obtained otherwise patient is sedated and on BiPAP in addition to her apparent underlying dementia    PAST MEDICAL HISTORY  Past Medical History   Diagnosis Date   • Lung cancer (CMS-[facility])    • COPD (chronic obstructive pulmonary disease) (CMS-[facility])    • Wallisian measles        FAMILY HISTORY  Family History   Problem Relation Age of Onset   • Hypertension Mother    • Lung Disease Father        SOCIAL HISTORY  Social History     Social History   • Marital Status: Unknown     Spouse Name: N/A   • Number of Children: N/A   • Years of Education: N/A     Social History Main Topics   • Smoking status: Never Smoker    • Smokeless tobacco: Never Used   • Alcohol Use: No   • Drug Use: No   • Sexual Activity: Not on file     Other Topics Concern   • Not on file     Social History Narrative   • No narrative on file       SURGICAL HISTORY  Past Surgical History   Procedure Laterality Date   • Lung biopsy open         CURRENT MEDICATIONS  Home Medications     **Home medications have not yet been reviewed for this encounter**          ALLERGIES  Allergies   Allergen Reactions   • Eggs    • Pollen Extract        PHYSICAL EXAM  VITAL SIGNS: BP  "122/67 mmHg  Pulse 109  Temp(Src) 36.8 °C (98.3 °F)  Resp 22  Ht 1.702 m (5' 7.01\")  Wt 68.04 kg (150 lb)  BMI 23.49 kg/m2  SpO2 98%     Constitutional: Elderly sedated on BiPAP  Psychiatric: Cannot be assessed  HENT: Normocephalic, Atraumatic, Bilateral external ears normal, mucous membranes moist,    Eyes: PERRLA, EOMI, Conjunctiva and lids normal, No discharge.   Neck: Normal range of motion,   Supple, No stridor.   Lymphatic: No cervical or axillary lymphadenopathy noted.   Cardiovascular: Tachycardic  Thorax & Lungs: Normal breath sounds on BiPAP mask breath sounds equal  Abdomen: Bowel sounds normal, Soft, No tenderness, No masses, No pulsatile masses. No guarding.  Skin: Warm, Dry, No erythema, No rash.   Back: No tenderness, No CVA tenderness.   Extremities: Intact distal pulses, No edema,    Musculoskeletal: Good range of motion in all major joints. No tenderness to palpation or major deformities, or injuries noted.   Neurologic: Can't be determined patient sedated           COURSE & MEDICAL DECISION MAKING  Pertinent Labs & Imaging studies reviewed. (See chart for details)  Patient presenting as a transfer from [facility] patient with respiratory failure on BiPAP apparently she has severe disease would try to avoid intubation. Family will be coming at some point to the ER patient admitted to ICU on BiPAP discuss with pulmonary and with hospitalist service. I was called emergently to the room for intubation however she does not seem to require emergent intubation at this time  FINAL IMPRESSION  1. Respiratory failure  2.   3.       Electronically signed by: Ozzy Wisdom, 8/7/2017 1:27 AM    "

## 2017-08-07 NOTE — ED NOTES
To ED from [facility] with resp failure on bipap. Lives at home with . Symptoms started 2 days ago.

## 2017-08-07 NOTE — CARE PLAN
Problem: Oxygenation:  Goal: Maintain adequate oxygenation dependent on patient condition  Outcome: PROGRESSING AS EXPECTED  Pt on 5lpm and uses 3-4lpm at home    Problem: Bronchoconstriction:  Goal: Improve in air movement and diminished wheezing  Outcome: PROGRESSING AS EXPECTED  DUO Q4 per protocol    Problem: Ventilation Defect:  Goal: Ability to achieve and maintain unassisted ventilation or tolerate decreased levels of ventilator support  Outcome: PROGRESSING AS EXPECTED  Pt was unable to take mask off in AM therefore BiPAP mask was removed. RT/MD/RN aware and in agreement

## 2017-08-07 NOTE — RESPIRATORY CARE
Bipap/Cpap mask placed.  Can the patient demonstrate removal of mask? no    MD aware, Pt to be DNR/DNI and to remain on BiPAP at this point

## 2017-08-07 NOTE — PROGRESS NOTES
CT of chest with contrast scheduled for Monday, 08/07/17 at 1030. Must have consent for contrast signed and have 20 gauge or larger above forearm.

## 2017-08-08 PROBLEM — J44.1 ACUTE EXACERBATION OF CHRONIC OBSTRUCTIVE PULMONARY DISEASE (COPD) (HCC): Status: ACTIVE | Noted: 2017-08-08

## 2017-08-08 LAB
ANION GAP SERPL CALC-SCNC: 5 MMOL/L (ref 0–11.9)
BASOPHILS # BLD AUTO: 0 % (ref 0–1.8)
BASOPHILS # BLD: 0 K/UL (ref 0–0.12)
BUN SERPL-MCNC: 31 MG/DL (ref 8–22)
CALCIUM SERPL-MCNC: 9 MG/DL (ref 8.5–10.5)
CHLORIDE SERPL-SCNC: 92 MMOL/L (ref 96–112)
CO2 SERPL-SCNC: 37 MMOL/L (ref 20–33)
CREAT SERPL-MCNC: 0.54 MG/DL (ref 0.5–1.4)
EOSINOPHIL # BLD AUTO: 0 K/UL (ref 0–0.51)
EOSINOPHIL NFR BLD: 0 % (ref 0–6.9)
ERYTHROCYTE [DISTWIDTH] IN BLOOD BY AUTOMATED COUNT: 48.1 FL (ref 35.9–50)
GFR SERPL CREATININE-BSD FRML MDRD: >60 ML/MIN/1.73 M 2
GLUCOSE SERPL-MCNC: 125 MG/DL (ref 65–99)
HCT VFR BLD AUTO: 29.1 % (ref 37–47)
HGB BLD-MCNC: 9.2 G/DL (ref 12–16)
IMM GRANULOCYTES # BLD AUTO: 0.04 K/UL (ref 0–0.11)
IMM GRANULOCYTES NFR BLD AUTO: 1.3 % (ref 0–0.9)
LYMPHOCYTES # BLD AUTO: 0.24 K/UL (ref 1–4.8)
LYMPHOCYTES NFR BLD: 7.5 % (ref 22–41)
MAGNESIUM SERPL-MCNC: 1.9 MG/DL (ref 1.5–2.5)
MCH RBC QN AUTO: 26.9 PG (ref 27–33)
MCHC RBC AUTO-ENTMCNC: 31.6 G/DL (ref 33.6–35)
MCV RBC AUTO: 85.1 FL (ref 81.4–97.8)
MONOCYTES # BLD AUTO: 0.16 K/UL (ref 0–0.85)
MONOCYTES NFR BLD AUTO: 5 % (ref 0–13.4)
NEUTROPHILS # BLD AUTO: 2.75 K/UL (ref 2–7.15)
NEUTROPHILS NFR BLD: 86.2 % (ref 44–72)
NRBC # BLD AUTO: 0 K/UL
NRBC BLD AUTO-RTO: 0 /100 WBC
PHOSPHATE SERPL-MCNC: 3.5 MG/DL (ref 2.5–4.5)
PLATELET # BLD AUTO: 145 K/UL (ref 164–446)
PMV BLD AUTO: 11.7 FL (ref 9–12.9)
POTASSIUM SERPL-SCNC: 4.2 MMOL/L (ref 3.6–5.5)
RBC # BLD AUTO: 3.42 M/UL (ref 4.2–5.4)
SODIUM SERPL-SCNC: 134 MMOL/L (ref 135–145)
WBC # BLD AUTO: 3.2 K/UL (ref 4.8–10.8)

## 2017-08-08 PROCEDURE — 700111 HCHG RX REV CODE 636 W/ 250 OVERRIDE (IP): Performed by: INTERNAL MEDICINE

## 2017-08-08 PROCEDURE — 84100 ASSAY OF PHOSPHORUS: CPT

## 2017-08-08 PROCEDURE — 700101 HCHG RX REV CODE 250: Performed by: INTERNAL MEDICINE

## 2017-08-08 PROCEDURE — 94640 AIRWAY INHALATION TREATMENT: CPT

## 2017-08-08 PROCEDURE — 700102 HCHG RX REV CODE 250 W/ 637 OVERRIDE(OP): Performed by: HOSPITALIST

## 2017-08-08 PROCEDURE — 85025 COMPLETE CBC W/AUTO DIFF WBC: CPT

## 2017-08-08 PROCEDURE — 770001 HCHG ROOM/CARE - MED/SURG/GYN PRIV*

## 2017-08-08 PROCEDURE — A9270 NON-COVERED ITEM OR SERVICE: HCPCS | Performed by: HOSPITALIST

## 2017-08-08 PROCEDURE — 700105 HCHG RX REV CODE 258: Performed by: INTERNAL MEDICINE

## 2017-08-08 PROCEDURE — 80048 BASIC METABOLIC PNL TOTAL CA: CPT

## 2017-08-08 PROCEDURE — 83735 ASSAY OF MAGNESIUM: CPT

## 2017-08-08 PROCEDURE — 700102 HCHG RX REV CODE 250 W/ 637 OVERRIDE(OP): Performed by: PHARMACIST

## 2017-08-08 PROCEDURE — 99233 SBSQ HOSP IP/OBS HIGH 50: CPT | Performed by: HOSPITALIST

## 2017-08-08 PROCEDURE — A9270 NON-COVERED ITEM OR SERVICE: HCPCS | Performed by: PHARMACIST

## 2017-08-08 RX ORDER — METHYLPREDNISOLONE SODIUM SUCCINATE 40 MG/ML
30 INJECTION, POWDER, LYOPHILIZED, FOR SOLUTION INTRAMUSCULAR; INTRAVENOUS EVERY 6 HOURS
Status: DISCONTINUED | OUTPATIENT
Start: 2017-08-08 | End: 2017-08-09

## 2017-08-08 RX ORDER — METHYLPREDNISOLONE SODIUM SUCCINATE 40 MG/ML
30 INJECTION, POWDER, LYOPHILIZED, FOR SOLUTION INTRAMUSCULAR; INTRAVENOUS EVERY 6 HOURS
Status: DISCONTINUED | OUTPATIENT
Start: 2017-08-08 | End: 2017-08-08

## 2017-08-08 RX ORDER — DOXYCYCLINE 100 MG/1
100 TABLET ORAL EVERY 12 HOURS
Status: DISCONTINUED | OUTPATIENT
Start: 2017-08-08 | End: 2017-08-10 | Stop reason: HOSPADM

## 2017-08-08 RX ORDER — BISACODYL 10 MG
10 SUPPOSITORY, RECTAL RECTAL
Status: DISCONTINUED | OUTPATIENT
Start: 2017-08-08 | End: 2017-08-10 | Stop reason: HOSPADM

## 2017-08-08 RX ORDER — AMOXICILLIN 250 MG
2 CAPSULE ORAL 2 TIMES DAILY
Status: DISCONTINUED | OUTPATIENT
Start: 2017-08-08 | End: 2017-08-10 | Stop reason: HOSPADM

## 2017-08-08 RX ORDER — POLYETHYLENE GLYCOL 3350 17 G/17G
1 POWDER, FOR SOLUTION ORAL
Status: DISCONTINUED | OUTPATIENT
Start: 2017-08-08 | End: 2017-08-10 | Stop reason: HOSPADM

## 2017-08-08 RX ORDER — IPRATROPIUM BROMIDE AND ALBUTEROL SULFATE 2.5; .5 MG/3ML; MG/3ML
3 SOLUTION RESPIRATORY (INHALATION)
Status: DISCONTINUED | OUTPATIENT
Start: 2017-08-08 | End: 2017-08-09

## 2017-08-08 RX ADMIN — AMPICILLIN SODIUM AND SULBACTAM SODIUM 3 G: 2; 1 INJECTION, POWDER, FOR SOLUTION INTRAMUSCULAR; INTRAVENOUS at 23:05

## 2017-08-08 RX ADMIN — STANDARDIZED SENNA CONCENTRATE AND DOCUSATE SODIUM 2 TABLET: 8.6; 5 TABLET, FILM COATED ORAL at 12:12

## 2017-08-08 RX ADMIN — AMPICILLIN SODIUM AND SULBACTAM SODIUM 3 G: 2; 1 INJECTION, POWDER, FOR SOLUTION INTRAMUSCULAR; INTRAVENOUS at 12:11

## 2017-08-08 RX ADMIN — STANDARDIZED SENNA CONCENTRATE AND DOCUSATE SODIUM 2 TABLET: 8.6; 5 TABLET, FILM COATED ORAL at 21:34

## 2017-08-08 RX ADMIN — ENOXAPARIN SODIUM 40 MG: 100 INJECTION SUBCUTANEOUS at 08:32

## 2017-08-08 RX ADMIN — METHYLPREDNISOLONE SODIUM SUCCINATE 30 MG: 40 INJECTION, POWDER, FOR SOLUTION INTRAMUSCULAR; INTRAVENOUS at 12:11

## 2017-08-08 RX ADMIN — BUDESONIDE AND FORMOTEROL FUMARATE DIHYDRATE 2 PUFF: 160; 4.5 AEROSOL RESPIRATORY (INHALATION) at 06:52

## 2017-08-08 RX ADMIN — METHYLPREDNISOLONE SODIUM SUCCINATE 62.5 MG: 125 INJECTION, POWDER, FOR SOLUTION INTRAMUSCULAR; INTRAVENOUS at 00:33

## 2017-08-08 RX ADMIN — DOXYCYCLINE 100 MG: 100 INJECTION, POWDER, LYOPHILIZED, FOR SOLUTION INTRAVENOUS at 08:33

## 2017-08-08 RX ADMIN — IPRATROPIUM BROMIDE AND ALBUTEROL SULFATE 3 ML: .5; 3 SOLUTION RESPIRATORY (INHALATION) at 06:52

## 2017-08-08 RX ADMIN — AMPICILLIN SODIUM AND SULBACTAM SODIUM 3 G: 2; 1 INJECTION, POWDER, FOR SOLUTION INTRAMUSCULAR; INTRAVENOUS at 05:37

## 2017-08-08 RX ADMIN — BUDESONIDE AND FORMOTEROL FUMARATE DIHYDRATE 2 PUFF: 160; 4.5 AEROSOL RESPIRATORY (INHALATION) at 19:11

## 2017-08-08 RX ADMIN — METHYLPREDNISOLONE SODIUM SUCCINATE 62.5 MG: 125 INJECTION, POWDER, FOR SOLUTION INTRAMUSCULAR; INTRAVENOUS at 05:37

## 2017-08-08 RX ADMIN — IPRATROPIUM BROMIDE AND ALBUTEROL SULFATE 3 ML: .5; 3 SOLUTION RESPIRATORY (INHALATION) at 11:25

## 2017-08-08 RX ADMIN — IPRATROPIUM BROMIDE AND ALBUTEROL SULFATE 3 ML: .5; 3 SOLUTION RESPIRATORY (INHALATION) at 02:10

## 2017-08-08 RX ADMIN — IPRATROPIUM BROMIDE AND ALBUTEROL SULFATE 3 ML: .5; 3 SOLUTION RESPIRATORY (INHALATION) at 14:10

## 2017-08-08 RX ADMIN — IPRATROPIUM BROMIDE AND ALBUTEROL SULFATE 3 ML: .5; 3 SOLUTION RESPIRATORY (INHALATION) at 19:10

## 2017-08-08 RX ADMIN — POLYETHYLENE GLYCOL 3350 1 PACKET: 17 POWDER, FOR SOLUTION ORAL at 17:49

## 2017-08-08 RX ADMIN — DOXYCYCLINE 100 MG: 100 TABLET ORAL at 21:34

## 2017-08-08 RX ADMIN — METHYLPREDNISOLONE SODIUM SUCCINATE 30 MG: 40 INJECTION, POWDER, FOR SOLUTION INTRAMUSCULAR; INTRAVENOUS at 17:50

## 2017-08-08 RX ADMIN — TIOTROPIUM BROMIDE 1 CAPSULE: 18 CAPSULE ORAL; RESPIRATORY (INHALATION) at 06:52

## 2017-08-08 RX ADMIN — METHYLPREDNISOLONE SODIUM SUCCINATE 30 MG: 40 INJECTION, POWDER, FOR SOLUTION INTRAMUSCULAR; INTRAVENOUS at 23:34

## 2017-08-08 RX ADMIN — AMPICILLIN SODIUM AND SULBACTAM SODIUM 3 G: 2; 1 INJECTION, POWDER, FOR SOLUTION INTRAMUSCULAR; INTRAVENOUS at 17:49

## 2017-08-08 ASSESSMENT — ENCOUNTER SYMPTOMS
ORTHOPNEA: 0
DIARRHEA: 0
HALLUCINATIONS: 0
PHOTOPHOBIA: 0
HEADACHES: 0
SENSORY CHANGE: 0
COUGH: 1
LOSS OF CONSCIOUSNESS: 0
BACK PAIN: 1
NAUSEA: 0
EYE PAIN: 0
DIZZINESS: 0
FEVER: 0
DEPRESSION: 0
TREMORS: 0
SPEECH CHANGE: 0
MYALGIAS: 0
VOMITING: 0
SHORTNESS OF BREATH: 1
SPUTUM PRODUCTION: 0
STRIDOR: 0
FOCAL WEAKNESS: 0
PND: 0
DOUBLE VISION: 0
NECK PAIN: 0
CLAUDICATION: 0
FLANK PAIN: 0
NERVOUS/ANXIOUS: 0
FALLS: 0
WHEEZING: 0
POLYDIPSIA: 0
HEMOPTYSIS: 0
CONSTIPATION: 0
DIAPHORESIS: 0
WEAKNESS: 0
EYE DISCHARGE: 0
BLOOD IN STOOL: 0
SORE THROAT: 0
CHILLS: 0
EYE REDNESS: 0
ABDOMINAL PAIN: 0
TINGLING: 0
HEARTBURN: 0
BLURRED VISION: 0
PALPITATIONS: 0
SEIZURES: 0
WEIGHT LOSS: 0

## 2017-08-08 ASSESSMENT — PAIN SCALES - GENERAL
PAINLEVEL_OUTOF10: 0

## 2017-08-08 ASSESSMENT — LIFESTYLE VARIABLES: SUBSTANCE_ABUSE: 0

## 2017-08-08 NOTE — CARE PLAN
Problem: Knowledge Deficit  Goal: Knowledge of disease process/condition, treatment plan, diagnostic tests, and medications will improve  Reviewed POC with pt and family, questions and concerns addressed    Problem: Skin Integrity  Goal: Risk for impaired skin integrity will decrease  Q2H turns in place, heels floated on pillows, pillows for positioning, lines and tubes monitored and repositioned prn

## 2017-08-08 NOTE — PALLIATIVE CARE
"Palliative Care follow-up  Call placed to patient's PCP Dr. Jessa Vásquez to discuss his current POC regarding this patient. He states that she and her  have \"wavered\" on code status in the past. He also notes that they had [facility] services but he was medically managing her care. He notes that the hospice support was merely \"counseling and emotional support\" for the family. He does not have any ACP/POLST documents on file. Spoke with BS RN who stated patient's  was at [facility].    Plan: Meet with pt/ this AM    Thank you for allowing Palliative Care to participate in this patient's care. Please feel free to call [phone] with any questions or concerns.  "

## 2017-08-08 NOTE — PROGRESS NOTES
Pt's daughter, Darline, called for updates. Now speaking to pt on the phone. All questions answered to best of my knowledge.

## 2017-08-08 NOTE — PROGRESS NOTES
Pulmonary Critical Care Progress Note      Date of service:  8/8/2017      Interval Events:  24 hour interval history reviewed  Reason for visit:  Respiratory failure, AECOPD       - A&O x 3   - SR/ST   - 3 L NC   - Unasyn 2/8, doxycycline   - taper Solumedrol    Improved SOB.  No increased cough.  No sputum production.  Feels better today.    Review of Systems   Constitutional: Negative for fever, chills, weight loss, malaise/fatigue and diaphoresis.   HENT: Negative for congestion, ear discharge, ear pain, hearing loss, nosebleeds, sore throat and tinnitus.    Eyes: Negative for blurred vision, double vision, photophobia, pain, discharge and redness.   Respiratory: Positive for cough and shortness of breath. Negative for hemoptysis, sputum production, wheezing and stridor.    Cardiovascular: Negative for chest pain, palpitations, orthopnea, claudication, leg swelling and PND.   Gastrointestinal: Negative for heartburn, nausea, vomiting, abdominal pain, diarrhea, constipation, blood in stool and melena.   Genitourinary: Negative for dysuria, urgency, frequency, hematuria and flank pain.   Musculoskeletal: Positive for back pain. Negative for myalgias, joint pain, falls and neck pain.   Skin: Negative for itching and rash.   Neurological: Negative for dizziness, tingling, tremors, sensory change, speech change, focal weakness, seizures, loss of consciousness, weakness and headaches.   Endo/Heme/Allergies: Negative for environmental allergies and polydipsia.   Psychiatric/Behavioral: Negative for depression, suicidal ideas, hallucinations and substance abuse. The patient is not nervous/anxious.          PFSH:  No change.    Respiratory:     Pulse Oximetry: 94 %  CXR personally reviewed  CXR with mild edema and LLLSSA  No wheezing with very few scattered crackles  No dullness    HemoDynamics:  Pulse: 100, Heart Rate (Monitored): 98  NIBP: 136/73 mmHg     SR  Improved LE edema     Recent Labs      08/07/17   0114   17   0523   TROPONINI  0.03  0.02   BNPBTYPENAT  78   --        Neuro:  Awake, alert  No focal weakness    Fluids:  Intake/Output       17 - 17 0659 (Not Admitted) 17 - 1759 17 - 17 0659      3792-2582 0620-0019 Total 7969-8280 6208-4755 Total 1900-0659 Total       Intake    I.V.  --  -- --  300  300 600  --  -- --    IV Piggyback Volume (IV Piggyback) -- -- -- 300 300 600 -- -- --    Total Intake -- -- -- 300 300 600 -- -- --       Output    Urine  --  400 400  460  345 805  --  -- --    Indwelling Cathether -- 400 400 460 345 805 -- -- --    Stool  --  -- --  --  -- --  --  -- --    Number of Times Stooled -- 0 x 0 x -- 0 x 0 x -- -- --    Total Output -- 400 400 460 345 805 -- -- --       Net I/O     -- -400 -400 -160 -45 -205 -- -- --        Weight: 61.6 kg (135 lb 12.9 oz)  Recent Labs      17   SODIUM  135  134*   POTASSIUM  4.2  4.2   CHLORIDE  91*  92*   CO2  39*  37*   BUN  25*  31*   CREATININE  0.57  0.54   MAGNESIUM   --   1.9   PHOSPHORUS   --   3.5   CALCIUM  8.6  9.0       GI/Nutrition:  Abd soft ND/NT    Liver Function  Recent Labs      17   033   ALTSGPT  10   --    ASTSGOT  14   --    ALKPHOSPHAT  58   --    TBILIRUBIN  0.3   --    GLUCOSE  117*  125*       Heme:  Recent Labs      17   033   RBC  3.53*  3.42*   HEMOGLOBIN  9.4*  9.2*   HEMATOCRIT  31.5*  29.1*   PLATELETCT  119*  145*       Infectious Disease:  Temp  Av.3 °C (99.1 °F)  Min: 37.3 °C (99.1 °F)  Max: 37.3 °C (99.1 °F)  Monitored Temp  Av °C (98.6 °F)  Min: 36.6 °C (97.9 °F)  Max: 37.3 °C (99.1 °F)    Recent Labs      17   0114  17   0330   WBC  4.5*  3.2*   NEUTSPOLYS  95.60*  86.20*   LYMPHOCYTES  3.50*  7.50*   MONOCYTES  0.90  5.00   EOSINOPHILS  0.00  0.00   BASOPHILS  0.00  0.00   ASTSGOT  14   --    ALTSGPT  10   --    ALKPHOSPHAT  58   --    TBILIRUBIN  0.3    --      Current Facility-Administered Medications   Medication Dose Frequency Provider Last Rate Last Dose   • Respiratory Care per Protocol   Continuous RT Kaley Travis M.D.       • enoxaparin (LOVENOX) inj 40 mg  40 mg DAILY Kaley Travis M.D.   40 mg at 08/07/17 0854   • acetaminophen (TYLENOL) tablet 650 mg  650 mg Q4HRS PRN Kaley Travis M.D.        Or   • acetaminophen (TYLENOL) suppository 650 mg  650 mg Q4HRS PRN Kaley Travis M.D.       • ondansetron (ZOFRAN) syringe/vial injection 4 mg  4 mg Q4HRS PRN Kaley Travis M.D.       • ondansetron (ZOFRAN ODT) dispertab 4 mg  4 mg Q4HRS PRN Kaley Travis M.D.       • lorazepam (ATIVAN) tablet 0.5 mg  0.5 mg Q6HRS PRN Kaley Travis M.D.        Or   • lorazepam (ATIVAN) injection 0.5 mg  0.5 mg Q6HRS PRN Kaley Travis M.D.       • methylPREDNISolone sod succ (SOLU-MEDROL) 125 MG injection 62.5 mg  62.5 mg Q6HRS Dago Mancia M.D.   62.5 mg at 08/08/17 0537   • ipratropium-albuterol (DUONEB) nebulizer solution 3 mL  3 mL Q4HRS (RT) Dago Mancia M.D.   3 mL at 08/08/17 0652   • tiotropium (SPIRIVA) 18 MCG inhalation capsule 1 Cap  1 Cap QDAILY (RT) Nuno Baron M.D.   1 Cap at 08/08/17 0652   • budesonide-formoterol (SYMBICORT) 160-4.5 MCG/ACT inhaler 2 Puff  2 Puff BID (RT) Nuno Baron M.D.   2 Puff at 08/08/17 0652   • ampicillin/sulbactam (UNASYN) 3 g in  mL IVPB  3 g Q6HRS Nuno Baron M.D.   Stopped at 08/08/17 0607   • doxycycline (VIBRAMYCIN) 100 mg in  mL IVPB  100 mg Q12HRS Nuno Baron M.D.   Stopped at 08/07/17 2220     Last reviewed on 8/7/2017  6:08 PM by Jackelin Gabriel R.N.    Quality  Measures:  Labs reviewed, Medications reviewed and Radiology images reviewed                        Assessment and Plan:    Acute on chronic hypercarbic and hypoxemic respiratory failure   - improved   - cont oxygen  AECOPD - taper Solumedrol   -  cont BDs  Acute tracheobronchitis   - cont Unasyn and doxycycline   - complete 8 days of therapy  Pulmonary HTN - RVSP 60 mm Hg  Chronic diastolic heart failure   - grade 1 diastolic dysfunction  Dementia  Hx of lung CA - treated with CyberKnife  DNR/DNI    OK to transfer out of ICU.  [facility] will follow.    Discussed with RN, RT, Team

## 2017-08-08 NOTE — PROGRESS NOTES
Pt primary care physician called RN, informed RN that patient is currently in hospice in his care.     Dr. Jain  [phone]

## 2017-08-08 NOTE — CARE PLAN
Problem: Safety  Goal: Will remain free from falls  Outcome: PROGRESSING AS EXPECTED    Problem: Discharge Barriers/Planning  Goal: Patient’s continuum of care needs will be met  Outcome: PROGRESSING AS EXPECTED

## 2017-08-08 NOTE — CARE PLAN
Problem: Venous Thromboembolism (VTW)/Deep Vein Thrombosis (DVT) Prevention:  Goal: Patient will participate in Venous Thrombosis (VTE)/Deep Vein Thrombosis (DVT)Prevention Measures    08/07/17 2000   Mechanical/VTE Prophylaxis   Mechanical Prophylaxis  SCDs, Sequential Compression Device   SCDs, Sequential Compression Device On   OTHER   Risk Assessment Score 6   VTE RISK Very High   Pharmacologic Prophylaxis Used LMWH: Enoxaparin(Lovenox)         Problem: Skin Integrity  Goal: Risk for impaired skin integrity will decrease  Skin assessment completed. Mepilex in place. Pillows in use to float heels. Q2hr turning to prevent skin breakdown.

## 2017-08-08 NOTE — CARE PLAN
Problem: Oxygenation:  Goal: Maintain adequate oxygenation dependent on patient condition  Outcome: PROGRESSING AS EXPECTED  Pt at baseline O2 needs    Problem: Bronchoconstriction:  Goal: Improve in air movement and diminished wheezing  Outcome: PROGRESSING AS EXPECTED  Intervention: Implement inhaled treatments  DUO QID per protocol

## 2017-08-08 NOTE — CONSULTS
"Reason for PC Consult: Advance Care Planning    Consulted by: Dr. Simeon, Hosp    Assessment:  General: 70 y/o woman transferred from [place] for COPD exacerbation and concerns for STEMI. PMHX of lung CA treated at [facility] with cyberknife, dementia, and COPD     Dyspnea: Yes- improved; on O2 at baseline 3L  Last BM:  (PTA)-    Pain: No-  Depression: No-    Dementia: Yes;  4    Spiritual:  Is Mormonism or spirituality important for coping with this illness? No-    Has a  or spiritual provider visit been requested? No    Palliative Performance Scale: 50%    Advance Directive: None- one exists per family; planning to bring to PC RN  DPOA: No- CATRACHO is  Nick who states he is also POA   POLST: No- completed today    Code Status: DNR- confirmed with family    Outcome:  PC RN met with Santo Bauer, daughter Darcie and YAKOV Goff at bedside and explained the role of PC. The family confirmed their involvement with the volunteer hospice team, but wavered on wishes to return to the hospital, etc. The patient repeatedly said \"I'm not dying\" and \"I'm not ready to die.\" PC RN validated her wishes. The patient's breakfast arrived and Santo asked to speak outside of the room. PC RN and the family utilized the family room for the remainder of the conversation. Santo expressed that Lizette's dementia is worsening and he struggles with when she says she wants everything done knowing she doesn't fully understand that. PC RN provided an in depth discussion of code status and what each piece means. The family as a whole given the current information and what they had been told by MDs at the ER in [place] felt that DNR was most appropriate. They still wanted Lizette to be treated with steroids, antibiotics, bipap, etc, but no major heroic interventions like CPR, shocking, or mechanical ventilation. PC RN explained the POLST form and assisted with the completion of it. Santo was very tearful and PC acknowledged the " difficulty of the conversation and reminded him that he's doing what Lizette, in her right mind, would ask to be done for herself; emotional support provided. PC RN also reminded him that her plan of care will need to be reevaluated with each new encounter or illness, which he understands. PC RN explained the role of hospice, notably the volunteer hospice in [place] and how they support him through this. The family felt at ease after the conversation and completing the POLST.     PC RN encouraged them to go eat some breakfast since Santo hadn't had a meal since yesterday AM. PC RN offered to complete the POLST, forward copies to [facility] and her PCP. PC RN instructed them on what to do with the POLST once returning home. YAKOV retrieving POA document for PC to scan into EMR.     While talking to Santo and family, PC RN provided active listening, normalization, validation of thoughts and feelings, as well as reinforced Lizette's goals of care. PC contact information provided to Santo and Darcie and encouraged to call with any questions or concerns.     Updated: BS RN/MD    Plan: back home with home health; POLST to PCP/Hospital    Thank you for allowing Palliative Care to participate in this patient's care. Please feel free to call [phone] with any questions or concerns.

## 2017-08-08 NOTE — CARE PLAN
Problem: Bronchoconstriction:  Goal: Improve in air movement and diminished wheezing  Intervention: Evaluate and manage medication effects  Respiratory Therapy Update    Interdisciplinary Plan of Care-Goals (Indications)  Obstructive Ventilatory Defect or Pulmonary Disease without Obvious Obstruction: History / Diagnosis (08/07/17 1410)  Interdisciplinary Plan of Care-Outcomes   Bronchodilator Outcome: Diminished Wheezing and Volume of Air Movement Increased (08/07/17 1410)          #SVN Performed: Yes (08/08/17 0210)    Cough: Congested;Moist;Non Productive (08/07/17 1410)  Sputum Amount: Unable to Evaluate (08/08/17 0000)  Sputum Color: Unable to Evaluate (08/08/17 0000)  Sputum Consistency: Unable to Evaluate (08/08/17 0000)                  O2 (LPM): 3 (08/08/17 0210)  O2 Daily Delivery Respiratory : OxyMask (08/08/17 0210)    Breath Sounds  Pre/Post Intervention: Pre Intervention Assessment (08/08/17 0210)  RUL Breath Sounds: Diminished (08/08/17 0210)  RML Breath Sounds: Diminished (08/08/17 0210)  RLL Breath Sounds: Diminished (08/08/17 0210)  ALEXANDER Breath Sounds: Diminished (08/08/17 0210)  LLL Breath Sounds: Diminished (08/08/17 0210)      Events/Summary/Plan: Pt placed on Oxy mask. Pt cannot remove Bipap mask (08/07/17 0841)    Pt. Started the shift at 5 lpm oxymask, FIO2 wean down to 3  Lpm via oxymask.

## 2017-08-08 NOTE — PALLIATIVE CARE
Palliative Care follow-up  Advance Directive received from Nick and scanned into EMR with completed POLST. Originals returned to Nick and copy of POLST made for daughter Darcie. Awaiting transfer to [facility]. MD aware of need for order to resume home health upon returning home. Nick was thankful for the visit.    Plan: continue to provide support    Thank you for allowing Palliative Care to participate in this patient's care. Please feel free to call [phone] with any questions or concerns.

## 2017-08-09 LAB
ANION GAP SERPL CALC-SCNC: 4 MMOL/L (ref 0–11.9)
BASOPHILS # BLD AUTO: 0 % (ref 0–1.8)
BASOPHILS # BLD: 0 K/UL (ref 0–0.12)
BUN SERPL-MCNC: 34 MG/DL (ref 8–22)
CALCIUM SERPL-MCNC: 9.2 MG/DL (ref 8.5–10.5)
CHLORIDE SERPL-SCNC: 92 MMOL/L (ref 96–112)
CO2 SERPL-SCNC: 41 MMOL/L (ref 20–33)
CREAT SERPL-MCNC: 0.49 MG/DL (ref 0.5–1.4)
EOSINOPHIL # BLD AUTO: 0 K/UL (ref 0–0.51)
EOSINOPHIL NFR BLD: 0 % (ref 0–6.9)
ERYTHROCYTE [DISTWIDTH] IN BLOOD BY AUTOMATED COUNT: 49 FL (ref 35.9–50)
GFR SERPL CREATININE-BSD FRML MDRD: >60 ML/MIN/1.73 M 2
GLUCOSE SERPL-MCNC: 133 MG/DL (ref 65–99)
HCT VFR BLD AUTO: 31 % (ref 37–47)
HGB BLD-MCNC: 9.7 G/DL (ref 12–16)
IMM GRANULOCYTES # BLD AUTO: 0.02 K/UL (ref 0–0.11)
IMM GRANULOCYTES NFR BLD AUTO: 0.5 % (ref 0–0.9)
IRON SATN MFR SERPL: 30 % (ref 15–55)
IRON SERPL-MCNC: 83 UG/DL (ref 40–170)
LYMPHOCYTES # BLD AUTO: 0.27 K/UL (ref 1–4.8)
LYMPHOCYTES NFR BLD: 6.8 % (ref 22–41)
MCH RBC QN AUTO: 26.7 PG (ref 27–33)
MCHC RBC AUTO-ENTMCNC: 31.3 G/DL (ref 33.6–35)
MCV RBC AUTO: 85.4 FL (ref 81.4–97.8)
MONOCYTES # BLD AUTO: 0.29 K/UL (ref 0–0.85)
MONOCYTES NFR BLD AUTO: 7.3 % (ref 0–13.4)
NEUTROPHILS # BLD AUTO: 3.38 K/UL (ref 2–7.15)
NEUTROPHILS NFR BLD: 85.4 % (ref 44–72)
NRBC # BLD AUTO: 0.03 K/UL
NRBC BLD AUTO-RTO: 0.8 /100 WBC
PLATELET # BLD AUTO: 156 K/UL (ref 164–446)
PMV BLD AUTO: 10.3 FL (ref 9–12.9)
POTASSIUM SERPL-SCNC: 3.5 MMOL/L (ref 3.6–5.5)
RBC # BLD AUTO: 3.63 M/UL (ref 4.2–5.4)
SODIUM SERPL-SCNC: 137 MMOL/L (ref 135–145)
TIBC SERPL-MCNC: 276 UG/DL (ref 250–450)
WBC # BLD AUTO: 4 K/UL (ref 4.8–10.8)

## 2017-08-09 PROCEDURE — 700101 HCHG RX REV CODE 250: Performed by: INTERNAL MEDICINE

## 2017-08-09 PROCEDURE — 85025 COMPLETE CBC W/AUTO DIFF WBC: CPT

## 2017-08-09 PROCEDURE — 700102 HCHG RX REV CODE 250 W/ 637 OVERRIDE(OP): Performed by: PHARMACIST

## 2017-08-09 PROCEDURE — 700105 HCHG RX REV CODE 258: Performed by: INTERNAL MEDICINE

## 2017-08-09 PROCEDURE — 302146: Performed by: HOSPITALIST

## 2017-08-09 PROCEDURE — 770006 HCHG ROOM/CARE - MED/SURG/GYN SEMI*

## 2017-08-09 PROCEDURE — A9270 NON-COVERED ITEM OR SERVICE: HCPCS | Performed by: PHARMACIST

## 2017-08-09 PROCEDURE — 700102 HCHG RX REV CODE 250 W/ 637 OVERRIDE(OP): Performed by: HOSPITALIST

## 2017-08-09 PROCEDURE — 36415 COLL VENOUS BLD VENIPUNCTURE: CPT

## 2017-08-09 PROCEDURE — 80048 BASIC METABOLIC PNL TOTAL CA: CPT

## 2017-08-09 PROCEDURE — A9270 NON-COVERED ITEM OR SERVICE: HCPCS | Performed by: HOSPITALIST

## 2017-08-09 PROCEDURE — 700111 HCHG RX REV CODE 636 W/ 250 OVERRIDE (IP): Performed by: HOSPITALIST

## 2017-08-09 PROCEDURE — 83540 ASSAY OF IRON: CPT

## 2017-08-09 PROCEDURE — 700111 HCHG RX REV CODE 636 W/ 250 OVERRIDE (IP): Performed by: INTERNAL MEDICINE

## 2017-08-09 PROCEDURE — 94640 AIRWAY INHALATION TREATMENT: CPT

## 2017-08-09 PROCEDURE — 83550 IRON BINDING TEST: CPT

## 2017-08-09 PROCEDURE — 99233 SBSQ HOSP IP/OBS HIGH 50: CPT | Performed by: HOSPITALIST

## 2017-08-09 RX ORDER — IPRATROPIUM BROMIDE AND ALBUTEROL SULFATE 2.5; .5 MG/3ML; MG/3ML
3 SOLUTION RESPIRATORY (INHALATION)
Status: DISCONTINUED | OUTPATIENT
Start: 2017-08-09 | End: 2017-08-10 | Stop reason: HOSPADM

## 2017-08-09 RX ORDER — ACETAZOLAMIDE 250 MG/1
250 TABLET ORAL
Status: DISCONTINUED | OUTPATIENT
Start: 2017-08-09 | End: 2017-08-10 | Stop reason: HOSPADM

## 2017-08-09 RX ORDER — TIOTROPIUM BROMIDE 18 UG/1
1 CAPSULE ORAL; RESPIRATORY (INHALATION) DAILY
Status: DISCONTINUED | OUTPATIENT
Start: 2017-08-10 | End: 2017-08-10 | Stop reason: HOSPADM

## 2017-08-09 RX ORDER — PREDNISONE 20 MG/1
60 TABLET ORAL DAILY
Status: DISCONTINUED | OUTPATIENT
Start: 2017-08-09 | End: 2017-08-10 | Stop reason: HOSPADM

## 2017-08-09 RX ORDER — OLMESARTAN MEDOXOMIL 20 MG/1
40 TABLET ORAL DAILY
Status: DISCONTINUED | OUTPATIENT
Start: 2017-08-09 | End: 2017-08-10 | Stop reason: HOSPADM

## 2017-08-09 RX ORDER — BUDESONIDE AND FORMOTEROL FUMARATE DIHYDRATE 160; 4.5 UG/1; UG/1
2 AEROSOL RESPIRATORY (INHALATION) 2 TIMES DAILY
Status: DISCONTINUED | OUTPATIENT
Start: 2017-08-09 | End: 2017-08-10 | Stop reason: HOSPADM

## 2017-08-09 RX ORDER — METOPROLOL SUCCINATE 100 MG/1
200 TABLET, EXTENDED RELEASE ORAL DAILY
Status: DISCONTINUED | OUTPATIENT
Start: 2017-08-09 | End: 2017-08-10 | Stop reason: HOSPADM

## 2017-08-09 RX ORDER — POTASSIUM CHLORIDE 20 MEQ/1
20 TABLET, EXTENDED RELEASE ORAL DAILY
Status: DISCONTINUED | OUTPATIENT
Start: 2017-08-09 | End: 2017-08-10 | Stop reason: HOSPADM

## 2017-08-09 RX ADMIN — TIOTROPIUM BROMIDE 1 CAPSULE: 18 CAPSULE ORAL; RESPIRATORY (INHALATION) at 07:01

## 2017-08-09 RX ADMIN — DOXYCYCLINE 100 MG: 100 TABLET ORAL at 20:23

## 2017-08-09 RX ADMIN — METOPROLOL SUCCINATE 200 MG: 100 TABLET, EXTENDED RELEASE ORAL at 12:47

## 2017-08-09 RX ADMIN — ENOXAPARIN SODIUM 40 MG: 100 INJECTION SUBCUTANEOUS at 09:10

## 2017-08-09 RX ADMIN — ACETAZOLAMIDE 250 MG: 250 TABLET ORAL at 17:43

## 2017-08-09 RX ADMIN — METHYLPREDNISOLONE SODIUM SUCCINATE 30 MG: 40 INJECTION, POWDER, FOR SOLUTION INTRAMUSCULAR; INTRAVENOUS at 12:45

## 2017-08-09 RX ADMIN — METHYLPREDNISOLONE SODIUM SUCCINATE 30 MG: 40 INJECTION, POWDER, FOR SOLUTION INTRAMUSCULAR; INTRAVENOUS at 05:12

## 2017-08-09 RX ADMIN — AMPICILLIN SODIUM AND SULBACTAM SODIUM 3 G: 2; 1 INJECTION, POWDER, FOR SOLUTION INTRAMUSCULAR; INTRAVENOUS at 05:12

## 2017-08-09 RX ADMIN — AMPICILLIN SODIUM AND SULBACTAM SODIUM 3 G: 2; 1 INJECTION, POWDER, FOR SOLUTION INTRAMUSCULAR; INTRAVENOUS at 17:35

## 2017-08-09 RX ADMIN — BUDESONIDE AND FORMOTEROL FUMARATE DIHYDRATE 2 PUFF: 160; 4.5 AEROSOL RESPIRATORY (INHALATION) at 07:01

## 2017-08-09 RX ADMIN — POTASSIUM CHLORIDE 20 MEQ: 1500 TABLET, EXTENDED RELEASE ORAL at 09:10

## 2017-08-09 RX ADMIN — IPRATROPIUM BROMIDE AND ALBUTEROL SULFATE 3 ML: .5; 3 SOLUTION RESPIRATORY (INHALATION) at 07:01

## 2017-08-09 RX ADMIN — DOXYCYCLINE 100 MG: 100 TABLET ORAL at 09:10

## 2017-08-09 RX ADMIN — BUDESONIDE AND FORMOTEROL FUMARATE DIHYDRATE 2 PUFF: 160; 4.5 AEROSOL RESPIRATORY (INHALATION) at 20:23

## 2017-08-09 RX ADMIN — AMPICILLIN SODIUM AND SULBACTAM SODIUM 3 G: 2; 1 INJECTION, POWDER, FOR SOLUTION INTRAMUSCULAR; INTRAVENOUS at 12:45

## 2017-08-09 RX ADMIN — PREDNISONE 60 MG: 20 TABLET ORAL at 17:43

## 2017-08-09 ASSESSMENT — ENCOUNTER SYMPTOMS
PALPITATIONS: 0
VOMITING: 0
NAUSEA: 0
ABDOMINAL PAIN: 0

## 2017-08-09 ASSESSMENT — PAIN SCALES - GENERAL
PAINLEVEL_OUTOF10: 6
PAINLEVEL_OUTOF10: 0

## 2017-08-09 NOTE — PROGRESS NOTES
Assumed care of pt at 0730am. Report received and bedside rounding completed with noc RN. Pt in bed resting comfortably denies any pain at this time and is calm. Pt confused at baseline.  No SOB, or in any acute distress. 2 large bowel movements. Fall precautions in place,- Treaded non slip socks. Call light and pt belongings within reach - pt makes needs known - hourly rounding in place. See flowsheets for further assessment.

## 2017-08-09 NOTE — PROGRESS NOTES
[facility]ist Progress Note    Date of Service: 2017    Chief Complaint  71 y.o. female admitted 2017 with shortness of breath    Interval Problem Update  :  The patient is pleasant but disoriented she does not recall why she is in the hospital  She does seem able to describe current complaints  She says that her breathing is fine, coughing a little, no chest pain  Patient is stable on nasal cannula at 3 L  Palliative care with the patient and her  today.  :  S/p ICU day #1 liberated from Bipap.  States wants to go home tomorrow.  Has home O2.  PT eval to ensure safe for HH at discharge.  Re-ordered home BP med:  benzicar 40mg po daily and metoprolol 200mg po daily.    Consultants/Specialty  Pulmonary    Disposition  plan is home with home health pending PT home safety eval.        Review of Systems   Cardiovascular: Negative for chest pain and palpitations.   Gastrointestinal: Negative for nausea, vomiting and abdominal pain.   Skin: Negative for itching and rash.      Physical Exam  Laboratory/Imaging   Hemodynamics  Temp (24hrs), Av.8 °C (98.3 °F), Min:36.7 °C (98 °F), Max:37.1 °C (98.8 °F)   Temperature: 37.1 °C (98.8 °F), Monitored Temp: 37.3 °C (99.1 °F)  Pulse  Av  Min: 74  Max: 133 Heart Rate (Monitored): 96  Blood Pressure : (!) 167/85 mmHg, NIBP: 145/70 mmHg      Respiratory      Respiration: 20, Pulse Oximetry: 95 %, O2 Daily Delivery Respiratory : Silicone Nasal Cannula     Given By:: Mask, #MDI/DPI Given: MDI/DPI x 2, Work Of Breathing / Effort: Tachypnea  RUL Breath Sounds: Diminished, RML Breath Sounds: Diminished, RLL Breath Sounds: Diminished, ALEXANDER Breath Sounds: Diminished, LLL Breath Sounds: Diminished    Fluids    Intake/Output Summary (Last 24 hours) at 17 1656  Last data filed at 17 1247   Gross per 24 hour   Intake   1080 ml   Output    180 ml   Net    900 ml       Nutrition  Orders Placed This Encounter   Procedures   • DIET ORDER     Standing  Status: Standing      Number of Occurrences: 1      Standing Expiration Date:      Order Specific Question:  Diet:     Answer:  Regular [1]     Physical Exam   Constitutional:   Chronically ill, frail-appearing, pleasantly confused   Eyes: Conjunctivae are normal. Left eye exhibits no discharge.   Cardiovascular: Normal rate and regular rhythm.    No murmur heard.  Pulmonary/Chest: Effort normal. She has no rales.   Prolonged expiratory phase  Moderate air movement  No active wheezing   Abdominal: Bowel sounds are normal.   Musculoskeletal: Normal range of motion. She exhibits no edema.   Neurological: She is alert.   Oriented to person and hospital           Recent Labs      08/07/17   0114  08/08/17   0330  08/09/17   0405   WBC  4.5*  3.2*  4.0*   RBC  3.53*  3.42*  3.63*   HEMOGLOBIN  9.4*  9.2*  9.7*   HEMATOCRIT  31.5*  29.1*  31.0*   MCV  89.2  85.1  85.4   MCH  26.6*  26.9*  26.7*   MCHC  29.8*  31.6*  31.3*   RDW  52.2*  48.1  49.0   PLATELETCT  119*  145*  156*   MPV  10.3  11.7  10.3     Recent Labs      08/07/17   0114  08/08/17   0330  08/09/17   0405   SODIUM  135  134*  137   POTASSIUM  4.2  4.2  3.5*   CHLORIDE  91*  92*  92*   CO2  39*  37*  41*   GLUCOSE  117*  125*  133*   BUN  25*  31*  34*   CREATININE  0.57  0.54  0.49*   CALCIUM  8.6  9.0  9.2         Recent Labs      08/07/17   0114   BNPBTYPENAT  78              Assessment/Plan     * Acute exacerbation of chronic obstructive pulmonary disease (COPD) (CMS-[facility]) (present on admission)  Assessment & Plan  8/9 taper Solu-Medrol to po prednisone, no wheeze      Acute on chronic respiratory failure with hypoxia and hypercapnia (CMS-[facility]) (present on admission)  Assessment & Plan  Acute on chronic respiratory failure  She has chronic hypoxia and hypercapnia  8/8 off BiPAP  Positive care consult appreciated, a POLST has been completed  Home O2 2-4 LPM NC 24/7.      Dementia (present on admission)  Assessment & Plan   is at bedside, the patient  is near her baseline    Elevated troponin (present on admission)  Assessment & Plan  Demand ischemia on admission  No chest pain per patient at any time during admission.    Personal history of lung cancer (present on admission)  Assessment & Plan  Unclear  May need CT chest as outpatient.  CXR in a.m.    Elevated d-dimer (present on admission)  Assessment & Plan  Suspect respiratory failure is due to COPD exacerbation  She has improved    Arrhythmia (present on admission)  Assessment & Plan  Pauses and Bigeminy noted en route from [facility]  Now resolved.  Check EKG.      Labs reviewed and Medications reviewed  Silverman catheter: No Silverman      DVT Prophylaxis: Enoxaparin (Lovenox)      Antibiotics: Treating active infection/contamination beyond 24 hours perioperative coverage

## 2017-08-09 NOTE — PROGRESS NOTES
Assumed pt care at 2000, pt in no distress.  A&Ox4,  present at bedside. Pt denies any pain and with no complaints.  Bed locked in lowest position, call light within reach, and bed alarm on.  Will continue to monitor and follow plan of care.

## 2017-08-09 NOTE — ASSESSMENT & PLAN NOTE
Acute on chronic respiratory failure  She has chronic hypoxia and hypercapnia  8/8 off BiPAP  Positive care consult appreciated, a POLST has been completed  Home O2 2-4 LPM NC 24/7.

## 2017-08-09 NOTE — CARE PLAN
Problem: Infection  Goal: Will remain free from infection  Outcome: PROGRESSING AS EXPECTED  Intervention: Implement standard precautions and perform hand washing before and after patient contact  Handwashing before and after care, rodriguez care also given with soap and water      Problem: Bowel/Gastric:  Goal: Normal bowel function is maintained or improved  Outcome: PROGRESSING SLOWER THAN EXPECTED  Stool softener given per bowel protocol, pt to also received milk of mag    Problem: Respiratory:  Goal: Respiratory status will improve  Intervention: Educate and encourage incentive spirometry usage  Pt instructed how to use IS, pt performed well  Pt also titrated back down to 3L NC, sats >95%

## 2017-08-09 NOTE — PROGRESS NOTES
Pt transfered from room S-134 by pt bed accompanied by 2 transporters and arrived on unit at 0445.  Pt assessed,  A&O x4 but forgetful at times, complaining of 6/10 back pain, declines pain medication.  2 RN skin assessment complete, skin intact.  Updated pt on unit routine including call light system, hourly rounding, white board info, need for bed alarm, and visitors policy.  Bed in lowest position, locked, bed alarm active, pt wearing treaded socks, and pt instructed on need to call for assistance prior to getting out of bed.  Call light, phone, and personal belongs within reach, white board updated.

## 2017-08-09 NOTE — DISCHARGE PLANNING
Received Home Health choice from [facility]) at 1521.  Home Health referral sent to [facility]([place]) at 1528.

## 2017-08-09 NOTE — FACE TO FACE
Face to Face Supporting Documentation - Home Health    The encounter with this patient was in whole or in part the primary reason for home health admission.    Date of encounter:   Patient:                    MRN:                       YOB: 2017  Lizette Salgado  [id number]  1945     Home health to see patient for:  Skilled Nursing care for assessment, interventions & education, Physical Therapy evaluation and treatment, Occupational therapy evaluation and treatment and Speech Language Pathology evaluation and treatment    Skilled need for:  Exacerbation of Chronic Disease State COPD    Skilled nursing interventions to include:  Comment: medicaiton managment    Homebound status evidenced by:  Need the aid of supportive devices such as crutches, canes, wheelchairs or walkers, Require the use of special transportation or Needs the assistance of another person in order to leave the home. Leaving home requires a considerable and taxing effort. There is a normal inability to leave the home.    Community Physician to provide follow up care: Ash Rice N.P.     Optional Interventions? No      I certify the face to face encounter for this home health care referral meets the CMS requirements and the encounter/clinical assessment with the patient was, in whole, or in part, for the medical condition(s) listed above, which is the primary reason for home health care. Based on my clinical findings: the service(s) are medically necessary, support the need for home health care, and the homebound criteria are met.  I certify that this patient has had a face to face encounter by myself.  Qamar Simeon M.D. - NPI: [id number]

## 2017-08-09 NOTE — PROGRESS NOTES
Alayna Song Fall Risk Assessment:     Last Known Fall: No falls  Mobility: Immobilized/requires assist of one person  Medications: Cardiovascular or central nervous system meds  Mental Status/LOC/Awareness: Awake, alert, and oriented to date, place, and person  Toileting Needs: Use of assistive device (Bedside commode, bedpan, urinal)  Volume/Electrolyte Status: No problems  Communication/Sensory: No deficits  Behavior: Appropriate behavior  Alayna Song Fall Risk Total: 8  Fall Risk Level: LOW RISK    Universal Fall Precautions:  call light/belongings in reach, bed in low position and locked, wheelchairs and assistive devices out of sight, siderails up x 2, use non-slip footwear, adequate lighting, clutter free and spill free environment, educate on level of risk, educate to call for assistance    Fall Risk Level Interventions:   TRIAL (TELE 8, NEURO, MED BRIA 5) Low Fall Risk Interventions  Place yellow fall risk ID band on patient: completed  Provide patient/family education based on risk assessment: completed  Educate patient/family to call staff for assistance when getting out of bed: completed  Place fall precaution signage outside patient door: completed       Patient Specific Interventions:     Medication: review medications with patient and family and assess for medications that can be discontinued or dosage decreased  Mental Status/LOC/Awareness: reinforce falls education and reinforce the use of call light  Toileting: provide frquent toileting and instruct patient/family on the need to call for assistance when toileting  Volume/Electrolyte Status: ensure patient remains hydrated and monitor abnormal lab values  Communication/Sensory: update plan of care on whiteboard  Behavioral: encourage patient to voice feelings, administer medication as ordered and instruct/reinforce fall program rationale  Mobility: ensure bed is locked and in lowest position and provide appropriate assistive device

## 2017-08-09 NOTE — PROGRESS NOTES
Pt transferred to S520-01.  Pt taken with transport.  All belongings taken with patient.  Unable to reach  regarding transfer, does not have phone.

## 2017-08-09 NOTE — CARE PLAN
Problem: Bronchoconstriction:  Goal: Improve in air movement and diminished wheezing  Intervention: Evaluate and manage medication effects  Respiratory Therapy Update    Interdisciplinary Plan of Care-Goals (Indications)  Obstructive Ventilatory Defect or Pulmonary Disease without Obvious Obstruction: History / Diagnosis (08/08/17 1410)  Interdisciplinary Plan of Care-Outcomes   Bronchodilator Outcome: Patient at Stable Baseline (08/08/17 1410)          #SVN Performed: Yes (08/08/17 1911)    Cough: Congested;Moist;Non Productive (08/07/17 1410)  Sputum Amount: Unable to Evaluate (08/08/17 2000)  Sputum Color: Unable to Evaluate (08/08/17 2000)  Sputum Consistency: Unable to Evaluate (08/08/17 2000)     O2 (LPM): 3 (08/09/17 0000)  O2 Daily Delivery Respiratory : Silicone Nasal Cannula (08/08/17 1911)    Breath Sounds  Pre/Post Intervention: Pre Intervention Assessment (08/08/17 1911)  RUL Breath Sounds: Diminished (08/08/17 2000)  RML Breath Sounds: Diminished (08/08/17 2000)  RLL Breath Sounds: Diminished (08/08/17 2000)  ALEXANDER Breath Sounds: Diminished (08/08/17 2000)  LLL Breath Sounds: Diminished (08/08/17 2000)      Continue to follow patient therapy modilaties.

## 2017-08-09 NOTE — PROGRESS NOTES
[facility] Hospitalist Progress Note    Date of Service: 2017    Chief Complaint  71 y.o. female admitted 2017 with shortness of breath    Interval Problem Update  The patient is pleasant but disoriented she does not recall why she is in the hospital  She does seem able to describe current complaints  She says that her breathing is fine, coughing a little, no chest pain  Patient is stable on nasal cannula at 3 L  Palliative care with the patient and her  today    Consultants/Specialty  Pulmonary    Disposition  Transfer out of ICU today  Eventual plan is probably home with home health        Review of Systems   Cardiovascular: Negative for chest pain and palpitations.   Gastrointestinal: Negative for nausea, vomiting and abdominal pain.   Skin: Negative for itching and rash.      Physical Exam  Laboratory/Imaging   Hemodynamics  No data recorded.   Monitored Temp: 37.3 °C (99.1 °F)  Pulse  Av.2  Min: 89  Max: 133 Heart Rate (Monitored): 96  NIBP: 145/70 mmHg      Respiratory      Respiration: (!) 23, Pulse Oximetry: 98 %, O2 Daily Delivery Respiratory : Silicone Nasal Cannula     Given By:: Mask, #MDI/DPI Given: MDI/DPI x 1, Work Of Breathing / Effort: Moderate  RUL Breath Sounds: Diminished, RML Breath Sounds: Diminished, RLL Breath Sounds: Diminished, ALEXANDER Breath Sounds: Diminished, LLL Breath Sounds: Diminished    Fluids    Intake/Output Summary (Last 24 hours) at 17  Last data filed at 17 1800   Gross per 24 hour   Intake   1460 ml   Output    685 ml   Net    775 ml       Nutrition  Orders Placed This Encounter   Procedures   • DIET ORDER     Standing Status: Standing      Number of Occurrences: 1      Standing Expiration Date:      Order Specific Question:  Diet:     Answer:  Regular [1]     Physical Exam   Constitutional:   Chronically ill, frail-appearing, pleasantly confused   Eyes: Conjunctivae are normal. Left eye exhibits no discharge.   Cardiovascular: Normal rate and  regular rhythm.    No murmur heard.  Pulmonary/Chest: Effort normal. She has no rales.   Prolonged expiratory phase  Moderate air movement  No active wheezing   Abdominal: Bowel sounds are normal.   Musculoskeletal: Normal range of motion. She exhibits no edema.   Neurological: She is alert.   Oriented to person and hospital  Tangential but cooperative         Recent Labs      08/07/17 0114 08/08/17   0330   WBC  4.5*  3.2*   RBC  3.53*  3.42*   HEMOGLOBIN  9.4*  9.2*   HEMATOCRIT  31.5*  29.1*   MCV  89.2  85.1   MCH  26.6*  26.9*   MCHC  29.8*  31.6*   RDW  52.2*  48.1   PLATELETCT  119*  145*   MPV  10.3  11.7     Recent Labs      08/07/17 0114 08/08/17 0330   SODIUM  135  134*   POTASSIUM  4.2  4.2   CHLORIDE  91*  92*   CO2  39*  37*   GLUCOSE  117*  125*   BUN  25*  31*   CREATININE  0.57  0.54   CALCIUM  8.6  9.0         Recent Labs      08/07/17 0114   BNPBTYPENAT  78              Assessment/Plan     * Acute exacerbation of chronic obstructive pulmonary disease (COPD) (CMS-[facility])  Assessment & Plan  Continue Solu-Medrol and antibiotics  Can probably transition to by mouth prednisone tomorrow    Acute on chronic respiratory failure with hypoxia and hypercapnia (CMS-HCC) (present on admission)  Assessment & Plan  Acute on chronic respiratory failure  She has chronic hypoxia and hypercapnia  Has improved she is off BiPAP  Okay to transfer out of ICU  Positive care consult appreciated, a POLST has been completed      Dementia (present on admission)  Assessment & Plan   is at bedside, the patient is near her baseline    Elevated d-dimer (present on admission)  Assessment & Plan  Suspect restaurant failure is due to COPD exacerbation  She has improved    Arrhythmia (present on admission)  Assessment & Plan  Pauses and Bigeminy noted en route from [facility]    Labs reviewed and Medications reviewed  Silverman catheter: No Silverman      DVT Prophylaxis: Enoxaparin (Lovenox)      Antibiotics: Treating active  infection/contamination beyond 24 hours perioperative coverage

## 2017-08-09 NOTE — DISCHARGE PLANNING
Medical Social Worker    KIM received  order. KIM got CHOICE from family for [facility] as [facility] is the only option for someone living in [place]. [facility] faxed CHOICE to [facility].

## 2017-08-10 ENCOUNTER — PATIENT OUTREACH (OUTPATIENT)
Dept: HEALTH INFORMATION MANAGEMENT | Facility: OTHER | Age: 72
End: 2017-08-10

## 2017-08-10 ENCOUNTER — APPOINTMENT (OUTPATIENT)
Dept: RADIOLOGY | Facility: MEDICAL CENTER | Age: 72
DRG: 189 | End: 2017-08-10
Attending: HOSPITALIST
Payer: MEDICARE

## 2017-08-10 VITALS
BODY MASS INDEX: 20.14 KG/M2 | WEIGHT: 128.31 LBS | RESPIRATION RATE: 17 BRPM | OXYGEN SATURATION: 96 % | SYSTOLIC BLOOD PRESSURE: 119 MMHG | HEIGHT: 67 IN | DIASTOLIC BLOOD PRESSURE: 57 MMHG | TEMPERATURE: 98.9 F | HEART RATE: 76 BPM

## 2017-08-10 PROBLEM — J44.9 COPD (CHRONIC OBSTRUCTIVE PULMONARY DISEASE) (HCC): Status: ACTIVE | Noted: 2017-08-10

## 2017-08-10 PROBLEM — R79.89 ELEVATED D-DIMER: Status: RESOLVED | Noted: 2017-08-07 | Resolved: 2017-08-10

## 2017-08-10 PROBLEM — I49.9 ARRHYTHMIA: Status: RESOLVED | Noted: 2017-08-07 | Resolved: 2017-08-10

## 2017-08-10 PROBLEM — E87.6 ACUTE HYPOKALEMIA: Status: ACTIVE | Noted: 2017-08-10

## 2017-08-10 PROBLEM — I27.20 PULMONARY HYPERTENSION (HCC): Status: ACTIVE | Noted: 2017-08-10

## 2017-08-10 PROBLEM — I51.89 LEFT VENTRICULAR DIASTOLIC DYSFUNCTION WITH PRESERVED SYSTOLIC FUNCTION: Status: ACTIVE | Noted: 2017-08-10

## 2017-08-10 PROBLEM — E87.6 ACUTE HYPOKALEMIA: Status: RESOLVED | Noted: 2017-08-10 | Resolved: 2017-08-10

## 2017-08-10 PROBLEM — J90 PLEURAL EFFUSION, BILATERAL: Status: ACTIVE | Noted: 2017-08-10

## 2017-08-10 PROBLEM — R79.89 ELEVATED TROPONIN: Status: RESOLVED | Noted: 2017-08-07 | Resolved: 2017-08-10

## 2017-08-10 LAB
ANION GAP SERPL CALC-SCNC: 5 MMOL/L (ref 0–11.9)
BUN SERPL-MCNC: 28 MG/DL (ref 8–22)
CALCIUM SERPL-MCNC: 9.1 MG/DL (ref 8.5–10.5)
CHLORIDE SERPL-SCNC: 97 MMOL/L (ref 96–112)
CO2 SERPL-SCNC: 36 MMOL/L (ref 20–33)
CREAT SERPL-MCNC: 0.53 MG/DL (ref 0.5–1.4)
GFR SERPL CREATININE-BSD FRML MDRD: >60 ML/MIN/1.73 M 2
GLUCOSE SERPL-MCNC: 99 MG/DL (ref 65–99)
POTASSIUM SERPL-SCNC: 3.5 MMOL/L (ref 3.6–5.5)
SODIUM SERPL-SCNC: 138 MMOL/L (ref 135–145)

## 2017-08-10 PROCEDURE — G8978 MOBILITY CURRENT STATUS: HCPCS | Mod: CI

## 2017-08-10 PROCEDURE — 700102 HCHG RX REV CODE 250 W/ 637 OVERRIDE(OP): Performed by: HOSPITALIST

## 2017-08-10 PROCEDURE — A9270 NON-COVERED ITEM OR SERVICE: HCPCS | Performed by: PHARMACIST

## 2017-08-10 PROCEDURE — 700105 HCHG RX REV CODE 258: Performed by: INTERNAL MEDICINE

## 2017-08-10 PROCEDURE — 700111 HCHG RX REV CODE 636 W/ 250 OVERRIDE (IP): Performed by: HOSPITALIST

## 2017-08-10 PROCEDURE — 700102 HCHG RX REV CODE 250 W/ 637 OVERRIDE(OP): Performed by: PHARMACIST

## 2017-08-10 PROCEDURE — 700111 HCHG RX REV CODE 636 W/ 250 OVERRIDE (IP): Performed by: INTERNAL MEDICINE

## 2017-08-10 PROCEDURE — G8979 MOBILITY GOAL STATUS: HCPCS | Mod: CI

## 2017-08-10 PROCEDURE — A9270 NON-COVERED ITEM OR SERVICE: HCPCS | Performed by: HOSPITALIST

## 2017-08-10 PROCEDURE — G8980 MOBILITY D/C STATUS: HCPCS | Mod: CI

## 2017-08-10 PROCEDURE — 97161 PT EVAL LOW COMPLEX 20 MIN: CPT

## 2017-08-10 PROCEDURE — 71010 DX-CHEST-PORTABLE (1 VIEW): CPT

## 2017-08-10 PROCEDURE — 80048 BASIC METABOLIC PNL TOTAL CA: CPT

## 2017-08-10 PROCEDURE — 99239 HOSP IP/OBS DSCHRG MGMT >30: CPT | Performed by: HOSPITALIST

## 2017-08-10 PROCEDURE — 36415 COLL VENOUS BLD VENIPUNCTURE: CPT

## 2017-08-10 RX ORDER — IPRATROPIUM BROMIDE AND ALBUTEROL SULFATE 2.5; .5 MG/3ML; MG/3ML
3 SOLUTION RESPIRATORY (INHALATION) EVERY 6 HOURS PRN
Qty: 180 BULLET | Refills: 3 | Status: SHIPPED | OUTPATIENT
Start: 2017-08-10

## 2017-08-10 RX ORDER — BUDESONIDE AND FORMOTEROL FUMARATE DIHYDRATE 160; 4.5 UG/1; UG/1
2 AEROSOL RESPIRATORY (INHALATION) 2 TIMES DAILY
Qty: 1 INHALER | Refills: 3 | Status: SHIPPED | OUTPATIENT
Start: 2017-08-10

## 2017-08-10 RX ORDER — TERAZOSIN 5 MG/1
5 CAPSULE ORAL 2 TIMES DAILY
Status: DISCONTINUED | OUTPATIENT
Start: 2017-08-10 | End: 2017-08-10 | Stop reason: HOSPADM

## 2017-08-10 RX ORDER — POTASSIUM CHLORIDE 20 MEQ/1
20 TABLET, EXTENDED RELEASE ORAL DAILY
Qty: 30 TAB | Refills: 0 | Status: SHIPPED | OUTPATIENT
Start: 2017-08-10

## 2017-08-10 RX ORDER — AMOXICILLIN AND CLAVULANATE POTASSIUM 875; 125 MG/1; MG/1
1 TABLET, FILM COATED ORAL 2 TIMES DAILY
Qty: 12 TAB | Refills: 0 | Status: SHIPPED | OUTPATIENT
Start: 2017-08-10 | End: 2017-08-16

## 2017-08-10 RX ORDER — PREDNISONE 20 MG/1
TABLET ORAL
Qty: 8 TAB | Refills: 0 | Status: SHIPPED | OUTPATIENT
Start: 2017-08-11

## 2017-08-10 RX ORDER — ACETAZOLAMIDE 250 MG/1
250 TABLET ORAL 2 TIMES DAILY
Qty: 60 TAB | Refills: 3 | Status: SHIPPED | OUTPATIENT
Start: 2017-08-10

## 2017-08-10 RX ORDER — TIOTROPIUM BROMIDE 18 UG/1
18 CAPSULE ORAL; RESPIRATORY (INHALATION) DAILY
Qty: 30 CAP | Refills: 3 | Status: SHIPPED | OUTPATIENT
Start: 2017-08-10

## 2017-08-10 RX ORDER — DOXYCYCLINE 100 MG/1
100 TABLET ORAL EVERY 12 HOURS
Qty: 7 TAB | Refills: 0 | Status: SHIPPED | OUTPATIENT
Start: 2017-08-10 | End: 2017-08-15

## 2017-08-10 RX ADMIN — ENOXAPARIN SODIUM 40 MG: 100 INJECTION SUBCUTANEOUS at 08:48

## 2017-08-10 RX ADMIN — DOXYCYCLINE 100 MG: 100 TABLET ORAL at 08:45

## 2017-08-10 RX ADMIN — METOPROLOL SUCCINATE 200 MG: 100 TABLET, EXTENDED RELEASE ORAL at 08:46

## 2017-08-10 RX ADMIN — TERAZOSIN HYDROCHLORIDE ANHYDROUS 5 MG: 5 CAPSULE ORAL at 10:49

## 2017-08-10 RX ADMIN — AMPICILLIN SODIUM AND SULBACTAM SODIUM 3 G: 2; 1 INJECTION, POWDER, FOR SOLUTION INTRAMUSCULAR; INTRAVENOUS at 01:21

## 2017-08-10 RX ADMIN — ACETAZOLAMIDE 250 MG: 250 TABLET ORAL at 05:59

## 2017-08-10 RX ADMIN — BUDESONIDE AND FORMOTEROL FUMARATE DIHYDRATE 2 PUFF: 160; 4.5 AEROSOL RESPIRATORY (INHALATION) at 08:50

## 2017-08-10 RX ADMIN — POTASSIUM CHLORIDE 20 MEQ: 1500 TABLET, EXTENDED RELEASE ORAL at 08:45

## 2017-08-10 RX ADMIN — AMPICILLIN SODIUM AND SULBACTAM SODIUM 3 G: 2; 1 INJECTION, POWDER, FOR SOLUTION INTRAMUSCULAR; INTRAVENOUS at 05:59

## 2017-08-10 RX ADMIN — PREDNISONE 60 MG: 20 TABLET ORAL at 08:47

## 2017-08-10 RX ADMIN — TIOTROPIUM BROMIDE 1 CAPSULE: 18 CAPSULE ORAL; RESPIRATORY (INHALATION) at 08:50

## 2017-08-10 RX ADMIN — OLMESARTAN MEDOXOMIL 40 MG: 20 TABLET, FILM COATED ORAL at 08:48

## 2017-08-10 ASSESSMENT — COGNITIVE AND FUNCTIONAL STATUS - GENERAL
MOVING TO AND FROM BED TO CHAIR: A LITTLE
SUGGESTED CMS G CODE MODIFIER MOBILITY: CJ
MOBILITY SCORE: 22
CLIMB 3 TO 5 STEPS WITH RAILING: A LITTLE

## 2017-08-10 ASSESSMENT — PAIN SCALES - GENERAL
PAINLEVEL_OUTOF10: 0
PAINLEVEL_OUTOF10: 0

## 2017-08-10 ASSESSMENT — GAIT ASSESSMENTS
GAIT LEVEL OF ASSIST: SUPERVISED
DISTANCE (FEET): 2

## 2017-08-10 ASSESSMENT — LIFESTYLE VARIABLES: DO YOU DRINK ALCOHOL: NO

## 2017-08-10 NOTE — PROGRESS NOTES
Assumed patient care at 1900. POC discussed w/ day nurse and pt, pt in agreement w/ goals. Pt AOx3, reoriented to time. Pt denies pain or nausea. Pt up one assist to bedside commode, generalized weakness. Dry non productive cough present, instructed patient on how to use IS, able to pull 750. 3-4L oxygen,  in place. PIV patent, IV abx running. Patient educated on use of call light, hourly rounding, and pain scale. Personal possession and call light within reach. Bed alarm refused,  at bedside. Patient calls appropriately.

## 2017-08-10 NOTE — DISCHARGE INSTRUCTIONS
Discharge Instructions    Discharged to home by car with relative. Discharged via wheelchair, hospital escort: Yes.  Special equipment needed: Not Applicable    Be sure to schedule a follow-up appointment with your primary care doctor or any specialists as instructed.     Discharge Plan:   Diet Plan: Discussed  Activity Level: Discussed  Confirmed Follow up Appointment: Patient to Call and Schedule Appointment  Confirmed Symptoms Management: Discussed  Medication Reconciliation Updated: Yes  Influenza Vaccine Indication: Not indicated: Previously immunized this influenza season and > 8 years of age    I understand that a diet low in cholesterol, fat, and sodium is recommended for good health. Unless I have been given specific instructions below for another diet, I accept this instruction as my diet prescription.   Other diet: Regular as tolerated.     Special Instructions: None    · Is patient discharged on Warfarin / Coumadin?   No     · Is patient Post Blood Transfusion?  No    Depression / Suicide Risk    As you are discharged from this [facility] facility, it is important to learn how to keep safe from harming yourself.    Recognize the warning signs:  · Abrupt changes in personality, positive or negative- including increase in energy   · Giving away possessions  · Change in eating patterns- significant weight changes-  positive or negative  · Change in sleeping patterns- unable to sleep or sleeping all the time   · Unwillingness or inability to communicate  · Depression  · Unusual sadness, discouragement and loneliness  · Talk of wanting to die  · Neglect of personal appearance   · Rebelliousness- reckless behavior  · Withdrawal from people/activities they love  · Confusion- inability to concentrate     If you or a loved one observes any of these behaviors or has concerns about self-harm, here's what you can do:  · Talk about it- your feelings and reasons for harming yourself  · Remove any means that you  might use to hurt yourself (examples: pills, rope, extension cords, firearm)  · Get professional help from the community (Mental Health, Substance Abuse, psychological counseling)  · Do not be alone:Call your Safe Contact- someone whom you trust who will be there for you.  · Call your local CRISIS HOTLINE 341-3168 or 679-774-7443  · Call your local Children's Mobile Crisis Response Team [place] (180) 012-1837 or www.Megathread  · Call the toll free National Suicide Prevention Hotlines   · National Suicide Prevention Lifeline 916-108-UDRC (6156)  · National Hope Line Network 800-SUICIDE (110-5927)

## 2017-08-10 NOTE — PROGRESS NOTES
Assumed care at 0700am report received from night nurse at bedside. Pt is A&Ox3 and denies any pain at this time and is calm. Pt slightly confused at baseline. No SOB or in any acute distress on 3 liters of oxygen per Nasal Cannula. Fall precautions in place. Pt states that she wants to be discharged today and will mostly likely be discharged with doctors orders. Call light within reach along with belongings and  and daughter is with her. All needs are met at this time and hourly rounding is in place.

## 2017-08-10 NOTE — DISCHARGE SUMMARY
CHIEF COMPLAINT ON ADMISSION  Chief Complaint   Patient presents with   • Respiratory Distress       CODE STATUS  DNR    HPI & HOSPITAL COURSE  This is a 71 y.o. female admitted 8/7/17 to ICU for rescue BiPaP for severe COPD exacerbation as transfer from [place].  Palliative care consult and pulmonology consult on admission, patient wished to be a DNR, POLST form was filled out changing code status to reflect patient wishes. Patient has h/o lung cancer treated with Cyberknife, dementia, COPD, pulmonary edema secondary to diastolic dysfunction with preserved EF of 55%.  She was liberated from BiPap and transferred to medical unit on 3 LPM NC.  Her steroids were tapered.  Her repeat CXR did not reveal any consolidations, but diffuses interstitial disease, prior rt lung clips and small b/l pleural effusions, pulmonary edema.  She had been off lasix, HCTZ and had an increase in CO2, therefore diamox was started with improvement of CO2 from 41 to 36 and decreased shortness of breath.  She will continue on diamox at discharge as well as Kdur 20meq daily.  She will finish 8 days of antibiotics per pulmonologist, BCs negative x2.  PT evaluation at discharge found patient to be back to baseline, has home wheelchair, home O2 2-4 LPM NC 24/7 and home health which was resumed.  She remained anemic, but no drop in Hg, normal iron levels and no acute blood loss noted during hospitalization.  Her blood pressure medications were resumed since they were held upon admission for elevated blood pressure.  The patient was very anxious to go home with family to [place].  Blood cultures negative x2 from 8/7/17.  CXR with improvement at discharge.    Therefore, she is discharged in good and stable condition with close outpatient follow-up.    SPECIFIC OUTPATIENT FOLLOW-UP  Follow up with PCP in 1-2 weeks for recheck COPD exacerbation, bronchitis, pulmonary edema.  Follow up Dr. Jensen in 3 weeks for recheck COPD.       DISCHARGE PROBLEM LIST  Principal Problem:    Acute exacerbation of chronic obstructive pulmonary disease (COPD) (CMS-[facility]) POA: Yes  Active Problems:    Acute on chronic respiratory failure with hypoxia and hypercapnia (CMS-[facility]) POA: Yes    Dementia POA: Yes    Personal history of lung cancer POA: Yes    COPD (chronic obstructive pulmonary disease) (CMS-[facility]) POA: Yes    Pleural effusion, bilateral POA: Yes    Left ventricular diastolic dysfunction with preserved systolic function (HCC) POA: Yes    Pulmonary hypertension (CMS-[facility]) POA: Yes  Resolved Problems:    Elevated d-dimer POA: Yes    Arrhythmia POA: Yes    Elevated troponin POA: Yes    Acute hypokalemia POA: Yes      FOLLOW UP    [facility] ([facility])  [address]  [address]  [place] [id number]  [phone]          MEDICATIONS ON DISCHARGE   NaomiLizette   Home Medication Instructions MONICA:[id number]    Printed on:08/10/17 1200   Medication Information                      acetaZOLAMIDE (DIAMOX) 250 MG Tab  Take 1 Tab by mouth 2 Times a Day.             amoxicillin-clavulanate (AUGMENTIN) 875-125 MG Tab  Take 1 Tab by mouth 2 times a day for 6 days.             budesonide-formoterol (SYMBICORT) 160-4.5 MCG/ACT Aerosol  Inhale 2 Puffs by mouth 2 Times a Day.             doxycycline monohydrate (ADOXA) 100 MG tablet  Take 1 Tab by mouth every 12 hours for 5 days.             ipratropium-albuterol (DUONEB) 0.5-2.5 (3) MG/3ML nebulizer solution  3 mL by Nebulization route every 6 hours as needed for Shortness of Breath.             metoprolol (TOPROL-XL) 200 MG XL tablet  Take 200 mg by mouth every day.             olmesartan (BENICAR) 40 MG Tab  Take 40 mg by mouth every day.             potassium chloride SA (KDUR) 20 MEQ Tab CR  Take 1 Tab by mouth every day.             predniSONE (DELTASONE) 20 MG Tab  Take 2 tablets po daily for 4 days.             terazosin (HYTRIN) 5 MG Cap  Take 5 mg by mouth 2 times a day.             tiotropium  (SPIRIVA) 18 MCG Cap  Inhale 1 Cap by mouth every day.                 DIET  Orders Placed This Encounter   Procedures   • DIET ORDER     Standing Status: Standing      Number of Occurrences: 1      Standing Expiration Date:      Order Specific Question:  Diet:     Answer:  Regular [1]       ACTIVITY  As tolerated and directed by rehab.  Home health resumed.  Weight bearing as tolerated      CONSULTATIONS  8/7/17:  Dr. Mancia:  Pulmonologist for rescue BiPap.  8/8/17:  Pall    PROCEDURES  8/7/17:  Rescue Bipap in ICU    LABORATORY  Lab Results   Component Value Date/Time    SODIUM 138 08/10/2017 01:39 AM    POTASSIUM 3.5* 08/10/2017 01:39 AM    CHLORIDE 97 08/10/2017 01:39 AM    CO2 36* 08/10/2017 01:39 AM    GLUCOSE 99 08/10/2017 01:39 AM    BUN 28* 08/10/2017 01:39 AM    CREATININE 0.53 08/10/2017 01:39 AM        Lab Results   Component Value Date/Time    WBC 4.0* 08/09/2017 04:05 AM    HEMOGLOBIN 9.7* 08/09/2017 04:05 AM    HEMATOCRIT 31.0* 08/09/2017 04:05 AM    PLATELET COUNT 156* 08/09/2017 04:05 AM        Total time of the discharge process exceeds 45 minutes

## 2017-08-10 NOTE — THERAPY
"Physical Therapy Evaluation completed.   Bed Mobility:  Supine to Sit: Minimal Assist  Transfers: Sit to Stand: Supervised  Gait: Level Of Assist: Supervised with No Equipment Needed       Plan of Care: Patient with no further skilled PT needs in the acute care setting at this time  Discharge Recommendations: Equipment: No Equipment Needed. Post-acute therapy Discharge to home with outpatient or home health for additional skilled therapy services.    See \"Rehab Therapy-Acute\" Patient Summary Report for complete documentation.     "

## 2017-08-10 NOTE — PALLIATIVE CARE
Palliative Care follow-up  Visited bedside; Lizette is eager to be going home today and home health being resumed upon returning home.  has all ACP documents and Lizette denied any other needs or questions at this time.    Plan: Home with home health today    Thank you for allowing Palliative Care to participate in this patient's care. Please feel free to call [phone] with any questions or concerns.

## 2017-08-10 NOTE — PROGRESS NOTES
Alayna Song Fall Risk Assessment:     Last Known Fall: No falls  Mobility: Immobilized/requires assist of one person  Medications: Cardiovascular or central nervous system meds  Mental Status/LOC/Awareness: Oriented to person and place  Toileting Needs: Use of assistive device (Bedside commode, bedpan, urinal)  Volume/Electrolyte Status: No problems  Communication/Sensory: No deficits  Behavior: Appropriate behavior  Alayna Song Fall Risk Total: 9  Fall Risk Level: LOW RISK    Universal Fall Precautions:  call light/belongings in reach, bed in low position and locked, wheelchairs and assistive devices out of sight, siderails up x 2, use non-slip footwear, clutter free and spill free environment, educate to call for assistance    Fall Risk Level Interventions:   TRIAL (TELE 8, NEURO, MED BRIA 5) Low Fall Risk Interventions  Place yellow fall risk ID band on patient: verified  Provide patient/family education based on risk assessment: completed  Educate patient/family to call staff for assistance when getting out of bed: completed  Place fall precaution signage outside patient door: verified      Patient Specific Interventions:     Medication: review medications with patient and family  Mental Status/LOC/Awareness: reorient patient, reinforce falls education, encourage family to stay with patient, check on patient hourly and reinforce the use of call light  Toileting: provide frquent toileting  Volume/Electrolyte Status: ensure patient remains hydrated and monitor abnormal lab values  Communication/Sensory: update plan of care on whiteboard  Behavioral: instruct/reinforce fall program rationale  Mobility: ensure bed is locked and in lowest position, provide appropriate assistive device and instruct patient to exit bed on their strongest side

## 2017-08-10 NOTE — PROGRESS NOTES
Pt in no acute distress no SOB. Discharge instruction information reviewed with pt. All questions answered at bed side. PIV removed. All prescriptions with pt and instructions given. Pt left via wheel chair with transport

## 2017-08-12 LAB
BACTERIA BLD CULT: NORMAL
BACTERIA BLD CULT: NORMAL
SIGNIFICANT IND 70042: NORMAL
SIGNIFICANT IND 70042: NORMAL
SITE SITE: NORMAL
SITE SITE: NORMAL
SOURCE SOURCE: NORMAL
SOURCE SOURCE: NORMAL

## 2017-08-23 ENCOUNTER — APPOINTMENT (OUTPATIENT)
Dept: PULMONOLOGY | Facility: HOSPICE | Age: 72
End: 2017-08-23
Payer: MEDICARE